# Patient Record
Sex: FEMALE | Race: WHITE
[De-identification: names, ages, dates, MRNs, and addresses within clinical notes are randomized per-mention and may not be internally consistent; named-entity substitution may affect disease eponyms.]

---

## 2022-10-06 ENCOUNTER — HOSPITAL ENCOUNTER (OUTPATIENT)
Dept: HOSPITAL 100 - PSN | Age: 67
Discharge: HOME | End: 2022-10-06
Payer: MEDICARE

## 2022-10-06 DIAGNOSIS — I48.0: Primary | ICD-10-CM

## 2022-10-06 PROCEDURE — 93225 XTRNL ECG REC<48 HRS REC: CPT

## 2022-10-06 PROCEDURE — 93226 XTRNL ECG REC<48 HR SCAN A/R: CPT

## 2022-10-24 ENCOUNTER — HOSPITAL ENCOUNTER (OUTPATIENT)
Dept: HOSPITAL 100 - CR | Age: 67
Discharge: HOME | End: 2022-10-24
Payer: MEDICARE

## 2022-10-24 DIAGNOSIS — I25.5: ICD-10-CM

## 2022-10-24 DIAGNOSIS — E78.5: ICD-10-CM

## 2022-10-24 DIAGNOSIS — Z95.5: Primary | ICD-10-CM

## 2022-10-24 DIAGNOSIS — I25.2: ICD-10-CM

## 2022-10-24 DIAGNOSIS — I46.9: ICD-10-CM

## 2022-10-24 DIAGNOSIS — I49.01: ICD-10-CM

## 2022-10-24 DIAGNOSIS — N18.9: ICD-10-CM

## 2022-10-24 DIAGNOSIS — I12.9: ICD-10-CM

## 2023-04-03 ENCOUNTER — HOSPITAL ENCOUNTER (EMERGENCY)
Age: 68
Discharge: HOME | End: 2023-04-03
Payer: MEDICARE

## 2023-04-03 VITALS
SYSTOLIC BLOOD PRESSURE: 139 MMHG | DIASTOLIC BLOOD PRESSURE: 84 MMHG | OXYGEN SATURATION: 98 % | RESPIRATION RATE: 15 BRPM | HEART RATE: 71 BPM

## 2023-04-03 VITALS — DIASTOLIC BLOOD PRESSURE: 64 MMHG | SYSTOLIC BLOOD PRESSURE: 112 MMHG

## 2023-04-03 VITALS
RESPIRATION RATE: 16 BRPM | OXYGEN SATURATION: 98 % | HEART RATE: 66 BPM | SYSTOLIC BLOOD PRESSURE: 93 MMHG | TEMPERATURE: 97.16 F | DIASTOLIC BLOOD PRESSURE: 59 MMHG

## 2023-04-03 VITALS — BODY MASS INDEX: 25.1 KG/M2

## 2023-04-03 DIAGNOSIS — I25.2: ICD-10-CM

## 2023-04-03 DIAGNOSIS — E78.5: ICD-10-CM

## 2023-04-03 DIAGNOSIS — E86.0: ICD-10-CM

## 2023-04-03 DIAGNOSIS — I48.0: ICD-10-CM

## 2023-04-03 DIAGNOSIS — I25.10: ICD-10-CM

## 2023-04-03 DIAGNOSIS — R19.7: Primary | ICD-10-CM

## 2023-04-03 DIAGNOSIS — I12.9: ICD-10-CM

## 2023-04-03 DIAGNOSIS — Z86.74: ICD-10-CM

## 2023-04-03 DIAGNOSIS — Z79.82: ICD-10-CM

## 2023-04-03 DIAGNOSIS — Z95.5: ICD-10-CM

## 2023-04-03 DIAGNOSIS — Z79.899: ICD-10-CM

## 2023-04-03 DIAGNOSIS — N18.9: ICD-10-CM

## 2023-04-03 DIAGNOSIS — N17.9: ICD-10-CM

## 2023-04-03 LAB
ALANINE AMINOTRANSFER ALT/SGPT: 15 U/L (ref 13–56)
ALBUMIN SERPL-MCNC: 3.1 G/DL (ref 3.2–5)
ALKALINE PHOSPHATASE: 70 U/L (ref 45–117)
ANION GAP: 6 (ref 5–15)
AST(SGOT): 14 U/L (ref 15–37)
BUN SERPL-MCNC: 30 MG/DL (ref 7–18)
BUN/CREAT RATIO: 16.3 RATIO (ref 10–20)
CALCIUM SERPL-MCNC: 9.5 MG/DL (ref 8.5–10.1)
CARBON DIOXIDE: 23 MMOL/L (ref 21–32)
CHLORIDE: 106 MMOL/L (ref 98–107)
DEPRECATED RDW RBC: 44.3 FL (ref 35.1–43.9)
DIFFERENTIAL INDICATED: (no result)
ERYTHROCYTE [DISTWIDTH] IN BLOOD: 13.4 % (ref 11.6–14.6)
EST GLOM FILT RATE - AFR AMER: 35 ML/MIN (ref 60–?)
ESTIMATED CREATININE CLEARANCE: 29.25 ML/MIN
GLOBULIN: 4.6 G/DL (ref 2.2–4.2)
GLUCOSE: 187 MG/DL (ref 74–106)
HCT VFR BLD AUTO: 34.9 % (ref 37–47)
HEMOGLOBIN: 11.5 G/DL (ref 12–15)
HGB BLD-MCNC: 11.5 G/DL (ref 12–15)
IMMATURE GRANULOCYTES COUNT: 0.03 X10^3/UL (ref 0–0)
MCV RBC: 89.5 FL (ref 81–99)
MEAN CORP HGB CONC: 33 G/DL (ref 32–36)
MEAN PLATELET VOL.: 9.9 FL (ref 6.2–12)
NRBC FLAGGED BY ANALYZER: 0 % (ref 0–5)
PLATELET # BLD: 176 K/MM3 (ref 150–450)
PLATELET COUNT: 176 K/MM3 (ref 150–450)
POSITIVE MORPHOLOGY: YES
POTASSIUM: 4.8 MMOL/L (ref 3.5–5.1)
RBC # BLD AUTO: 3.9 M/MM3 (ref 4.2–5.4)
RBC DISTRIBUTION WIDTH CV: 13.4 % (ref 11.6–14.6)
RBC DISTRIBUTION WIDTH SD: 44.3 FL (ref 35.1–43.9)
SCAN SMEAR PER REVIEW CRITERIA: (no result)
WBC # BLD AUTO: 6.8 K/MM3 (ref 4.4–11)
WHITE BLOOD COUNT: 6.8 K/MM3 (ref 4.4–11)

## 2023-04-03 PROCEDURE — 85025 COMPLETE CBC W/AUTO DIFF WBC: CPT

## 2023-04-03 PROCEDURE — 87493 C DIFF AMPLIFIED PROBE: CPT

## 2023-04-03 PROCEDURE — 83630 LACTOFERRIN FECAL (QUAL): CPT

## 2023-04-03 PROCEDURE — 87209 SMEAR COMPLEX STAIN: CPT

## 2023-04-03 PROCEDURE — 99283 EMERGENCY DEPT VISIT LOW MDM: CPT

## 2023-04-03 PROCEDURE — 87177 OVA AND PARASITES SMEARS: CPT

## 2023-04-03 PROCEDURE — 96360 HYDRATION IV INFUSION INIT: CPT

## 2023-04-03 PROCEDURE — A4216 STERILE WATER/SALINE, 10 ML: HCPCS

## 2023-04-03 PROCEDURE — 87506 IADNA-DNA/RNA PROBE TQ 6-11: CPT

## 2023-04-03 PROCEDURE — 96361 HYDRATE IV INFUSION ADD-ON: CPT

## 2023-04-03 PROCEDURE — 80053 COMPREHEN METABOLIC PANEL: CPT

## 2023-05-20 LAB
ANION GAP IN SER/PLAS: 14 MMOL/L (ref 10–20)
CALCIUM (MG/DL) IN SER/PLAS: 10 MG/DL (ref 8.6–10.6)
CARBON DIOXIDE, TOTAL (MMOL/L) IN SER/PLAS: 26 MMOL/L (ref 21–32)
CHLORIDE (MMOL/L) IN SER/PLAS: 101 MMOL/L (ref 98–107)
CREATININE (MG/DL) IN SER/PLAS: 1.32 MG/DL (ref 0.5–1.05)
GFR FEMALE: 44 ML/MIN/1.73M2
GLUCOSE (MG/DL) IN SER/PLAS: 170 MG/DL (ref 74–99)
POTASSIUM (MMOL/L) IN SER/PLAS: 4.7 MMOL/L (ref 3.5–5.3)
SODIUM (MMOL/L) IN SER/PLAS: 136 MMOL/L (ref 136–145)
UREA NITROGEN (MG/DL) IN SER/PLAS: 29 MG/DL (ref 6–23)

## 2023-07-12 DIAGNOSIS — E11.9 CONTROLLED TYPE 2 DIABETES MELLITUS WITHOUT COMPLICATION, WITHOUT LONG-TERM CURRENT USE OF INSULIN (MULTI): Primary | ICD-10-CM

## 2023-07-12 DIAGNOSIS — I10 ESSENTIAL (PRIMARY) HYPERTENSION: ICD-10-CM

## 2023-07-12 RX ORDER — AMLODIPINE BESYLATE 2.5 MG/1
TABLET ORAL
Qty: 90 TABLET | Refills: 3 | OUTPATIENT
Start: 2023-07-12

## 2023-07-12 RX ORDER — METFORMIN HYDROCHLORIDE 500 MG/1
500 TABLET ORAL EVERY 12 HOURS
Qty: 180 TABLET | Refills: 0 | Status: SHIPPED | OUTPATIENT
Start: 2023-07-12 | End: 2023-07-31 | Stop reason: SDUPTHER

## 2023-07-12 RX ORDER — METFORMIN HYDROCHLORIDE 500 MG/1
1 TABLET ORAL EVERY 12 HOURS
COMMUNITY
Start: 2022-10-20 | End: 2023-07-12 | Stop reason: SDUPTHER

## 2023-07-30 PROBLEM — R93.89 ABNORMAL CHEST CT: Status: RESOLVED | Noted: 2023-07-30 | Resolved: 2023-07-30

## 2023-07-30 PROBLEM — R93.89 ABNORMAL CHEST CT: Status: ACTIVE | Noted: 2023-07-30

## 2023-07-30 PROBLEM — E55.9 VITAMIN D DEFICIENCY: Status: ACTIVE | Noted: 2023-07-30

## 2023-07-30 PROBLEM — I10 BENIGN ESSENTIAL HYPERTENSION: Status: ACTIVE | Noted: 2023-07-30

## 2023-07-30 PROBLEM — N18.30 CKD STAGE 3 SECONDARY TO DIABETES (MULTI): Status: ACTIVE | Noted: 2023-07-30

## 2023-07-30 PROBLEM — R80.9 MICROALBUMINURIA: Status: ACTIVE | Noted: 2023-07-30

## 2023-07-30 PROBLEM — I25.2 HISTORY OF ST ELEVATION MYOCARDIAL INFARCTION (STEMI): Status: ACTIVE | Noted: 2023-07-30

## 2023-07-30 PROBLEM — I25.10 CAD (CORONARY ARTERY DISEASE): Status: ACTIVE | Noted: 2023-07-30

## 2023-07-30 PROBLEM — J18.9 PNEUMONIA: Status: ACTIVE | Noted: 2023-07-30

## 2023-07-30 PROBLEM — E78.5 HYPERLIPIDEMIA: Status: ACTIVE | Noted: 2023-07-30

## 2023-07-30 PROBLEM — E11.22 CKD STAGE 3 SECONDARY TO DIABETES (MULTI): Status: ACTIVE | Noted: 2023-07-30

## 2023-07-30 PROBLEM — E11.9 DIABETES MELLITUS, TYPE 2 (MULTI): Status: ACTIVE | Noted: 2023-07-30

## 2023-07-30 PROBLEM — I25.5 ISCHEMIC CARDIOMYOPATHY: Status: ACTIVE | Noted: 2023-07-30

## 2023-07-30 PROBLEM — R76.8 POSITIVE ANA (ANTINUCLEAR ANTIBODY): Status: ACTIVE | Noted: 2023-07-30

## 2023-07-30 PROBLEM — R79.89 ABNORMAL TSH: Status: ACTIVE | Noted: 2023-07-30

## 2023-07-30 PROBLEM — J18.9 PNEUMONIA: Status: RESOLVED | Noted: 2023-07-30 | Resolved: 2023-07-30

## 2023-07-30 PROBLEM — D64.9 ANEMIA: Status: ACTIVE | Noted: 2023-07-30

## 2023-07-30 PROBLEM — I48.0 PAROXYSMAL ATRIAL FIBRILLATION (MULTI): Status: ACTIVE | Noted: 2023-07-30

## 2023-07-30 RX ORDER — SITAGLIPTIN 50 MG/1
1 TABLET, FILM COATED ORAL DAILY
COMMUNITY
Start: 2022-10-20 | End: 2023-07-31 | Stop reason: SDUPTHER

## 2023-07-30 RX ORDER — CARVEDILOL 25 MG/1
1 TABLET ORAL 2 TIMES DAILY
COMMUNITY
Start: 2022-10-13 | End: 2023-07-31 | Stop reason: SDUPTHER

## 2023-07-30 RX ORDER — TICAGRELOR 90 MG/1
1 TABLET ORAL 2 TIMES DAILY
COMMUNITY
Start: 2022-03-09 | End: 2023-08-02 | Stop reason: SINTOL

## 2023-07-30 RX ORDER — IBUPROFEN 200 MG
CAPSULE ORAL
COMMUNITY
Start: 2020-11-25

## 2023-07-30 RX ORDER — LISINOPRIL 10 MG/1
1 TABLET ORAL DAILY
COMMUNITY
Start: 2020-03-10 | End: 2023-07-31 | Stop reason: SDUPTHER

## 2023-07-30 RX ORDER — ATORVASTATIN CALCIUM 80 MG/1
1 TABLET, FILM COATED ORAL DAILY
COMMUNITY
Start: 2020-11-19 | End: 2023-07-31 | Stop reason: SDUPTHER

## 2023-07-30 RX ORDER — VIT C/E/ZN/COPPR/LUTEIN/ZEAXAN 250MG-90MG
1 CAPSULE ORAL DAILY
COMMUNITY
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
## 2023-07-31 ENCOUNTER — LAB (OUTPATIENT)
Dept: LAB | Facility: LAB | Age: 68
End: 2023-07-31
Payer: MEDICARE

## 2023-07-31 ENCOUNTER — OFFICE VISIT (OUTPATIENT)
Dept: PRIMARY CARE | Facility: CLINIC | Age: 68
End: 2023-07-31
Payer: MEDICARE

## 2023-07-31 VITALS
RESPIRATION RATE: 16 BRPM | BODY MASS INDEX: 25.93 KG/M2 | OXYGEN SATURATION: 97 % | TEMPERATURE: 97.7 F | DIASTOLIC BLOOD PRESSURE: 69 MMHG | WEIGHT: 171.1 LBS | HEIGHT: 68 IN | SYSTOLIC BLOOD PRESSURE: 102 MMHG | HEART RATE: 103 BPM

## 2023-07-31 DIAGNOSIS — Z00.00 ROUTINE GENERAL MEDICAL EXAMINATION AT HEALTH CARE FACILITY: Primary | ICD-10-CM

## 2023-07-31 DIAGNOSIS — I25.5 ISCHEMIC CARDIOMYOPATHY: ICD-10-CM

## 2023-07-31 DIAGNOSIS — D64.9 ANEMIA, UNSPECIFIED TYPE: ICD-10-CM

## 2023-07-31 DIAGNOSIS — R06.09 DYSPNEA ON EXERTION: ICD-10-CM

## 2023-07-31 DIAGNOSIS — I25.2 HISTORY OF ST ELEVATION MYOCARDIAL INFARCTION (STEMI): ICD-10-CM

## 2023-07-31 DIAGNOSIS — I25.10 CORONARY ARTERY DISEASE INVOLVING NATIVE CORONARY ARTERY OF NATIVE HEART WITHOUT ANGINA PECTORIS: ICD-10-CM

## 2023-07-31 DIAGNOSIS — K52.9 CHRONIC DIARRHEA: ICD-10-CM

## 2023-07-31 DIAGNOSIS — E11.22 CKD STAGE 3 SECONDARY TO DIABETES (MULTI): ICD-10-CM

## 2023-07-31 DIAGNOSIS — I48.91 ATRIAL FIBRILLATION WITH RVR (MULTI): ICD-10-CM

## 2023-07-31 DIAGNOSIS — E78.5 HYPERLIPIDEMIA, UNSPECIFIED HYPERLIPIDEMIA TYPE: ICD-10-CM

## 2023-07-31 DIAGNOSIS — I48.0 PAROXYSMAL ATRIAL FIBRILLATION (MULTI): ICD-10-CM

## 2023-07-31 DIAGNOSIS — R79.89 ABNORMAL TSH: ICD-10-CM

## 2023-07-31 DIAGNOSIS — E11.9 TYPE 2 DIABETES MELLITUS WITHOUT COMPLICATION, WITHOUT LONG-TERM CURRENT USE OF INSULIN (MULTI): ICD-10-CM

## 2023-07-31 DIAGNOSIS — E55.9 VITAMIN D DEFICIENCY: ICD-10-CM

## 2023-07-31 DIAGNOSIS — Z12.31 SCREENING MAMMOGRAM, ENCOUNTER FOR: ICD-10-CM

## 2023-07-31 DIAGNOSIS — N18.30 CKD STAGE 3 SECONDARY TO DIABETES (MULTI): ICD-10-CM

## 2023-07-31 DIAGNOSIS — E11.9 CONTROLLED TYPE 2 DIABETES MELLITUS WITHOUT COMPLICATION, WITHOUT LONG-TERM CURRENT USE OF INSULIN (MULTI): ICD-10-CM

## 2023-07-31 DIAGNOSIS — R80.9 MICROALBUMINURIA: ICD-10-CM

## 2023-07-31 DIAGNOSIS — I10 BENIGN ESSENTIAL HYPERTENSION: ICD-10-CM

## 2023-07-31 DIAGNOSIS — Z00.00 MEDICARE ANNUAL WELLNESS VISIT, SUBSEQUENT: ICD-10-CM

## 2023-07-31 LAB — POC HEMOGLOBIN A1C: 8.5 % (ref 4.2–6.5)

## 2023-07-31 PROCEDURE — 82746 ASSAY OF FOLIC ACID SERUM: CPT

## 2023-07-31 PROCEDURE — 4010F ACE/ARB THERAPY RXD/TAKEN: CPT | Performed by: FAMILY MEDICINE

## 2023-07-31 PROCEDURE — 85025 COMPLETE CBC W/AUTO DIFF WBC: CPT

## 2023-07-31 PROCEDURE — 82570 ASSAY OF URINE CREATININE: CPT

## 2023-07-31 PROCEDURE — 3078F DIAST BP <80 MM HG: CPT | Performed by: FAMILY MEDICINE

## 2023-07-31 PROCEDURE — 82043 UR ALBUMIN QUANTITATIVE: CPT

## 2023-07-31 PROCEDURE — 36415 COLL VENOUS BLD VENIPUNCTURE: CPT

## 2023-07-31 PROCEDURE — 83036 HEMOGLOBIN GLYCOSYLATED A1C: CPT | Performed by: FAMILY MEDICINE

## 2023-07-31 PROCEDURE — 99215 OFFICE O/P EST HI 40 MIN: CPT | Performed by: FAMILY MEDICINE

## 2023-07-31 PROCEDURE — 3066F NEPHROPATHY DOC TX: CPT | Performed by: FAMILY MEDICINE

## 2023-07-31 PROCEDURE — 1036F TOBACCO NON-USER: CPT | Performed by: FAMILY MEDICINE

## 2023-07-31 PROCEDURE — 1160F RVW MEDS BY RX/DR IN RCRD: CPT | Performed by: FAMILY MEDICINE

## 2023-07-31 PROCEDURE — 93000 ELECTROCARDIOGRAM COMPLETE: CPT | Performed by: FAMILY MEDICINE

## 2023-07-31 PROCEDURE — 82728 ASSAY OF FERRITIN: CPT

## 2023-07-31 PROCEDURE — 3074F SYST BP LT 130 MM HG: CPT | Performed by: FAMILY MEDICINE

## 2023-07-31 PROCEDURE — 1170F FXNL STATUS ASSESSED: CPT | Performed by: FAMILY MEDICINE

## 2023-07-31 PROCEDURE — 83550 IRON BINDING TEST: CPT

## 2023-07-31 PROCEDURE — 80053 COMPREHEN METABOLIC PANEL: CPT

## 2023-07-31 PROCEDURE — 1159F MED LIST DOCD IN RCRD: CPT | Performed by: FAMILY MEDICINE

## 2023-07-31 PROCEDURE — 83540 ASSAY OF IRON: CPT

## 2023-07-31 PROCEDURE — 82306 VITAMIN D 25 HYDROXY: CPT

## 2023-07-31 PROCEDURE — G0439 PPPS, SUBSEQ VISIT: HCPCS | Performed by: FAMILY MEDICINE

## 2023-07-31 PROCEDURE — 82607 VITAMIN B-12: CPT

## 2023-07-31 RX ORDER — FERROUS SULFATE 325(65) MG
TABLET ORAL
COMMUNITY
Start: 2022-08-12

## 2023-07-31 RX ORDER — ATORVASTATIN CALCIUM 80 MG/1
80 TABLET, FILM COATED ORAL DAILY
Qty: 90 TABLET | Refills: 3 | Status: SHIPPED | OUTPATIENT
Start: 2023-07-31 | End: 2024-07-30

## 2023-07-31 RX ORDER — LISINOPRIL 10 MG/1
10 TABLET ORAL DAILY
Qty: 90 TABLET | Refills: 3 | Status: SHIPPED | OUTPATIENT
Start: 2023-07-31 | End: 2023-08-04 | Stop reason: ALTCHOICE

## 2023-07-31 RX ORDER — CARVEDILOL 25 MG/1
25 TABLET ORAL 2 TIMES DAILY
Qty: 180 TABLET | Refills: 3 | Status: SHIPPED | OUTPATIENT
Start: 2023-07-31 | End: 2024-07-30

## 2023-07-31 RX ORDER — METFORMIN HYDROCHLORIDE 500 MG/1
500 TABLET ORAL EVERY 12 HOURS
Qty: 180 TABLET | Refills: 3 | Status: SHIPPED | OUTPATIENT
Start: 2023-07-31 | End: 2024-02-22 | Stop reason: SDUPTHER

## 2023-07-31 ASSESSMENT — ENCOUNTER SYMPTOMS
HEADACHES: 0
PALPITATIONS: 0
ABDOMINAL PAIN: 0
NAUSEA: 0
DIZZINESS: 0
CHILLS: 0
NUMBNESS: 0
MUSCULOSKELETAL NEGATIVE: 1
SORE THROAT: 0
FEVER: 0
SHORTNESS OF BREATH: 1
DIARRHEA: 0
CONSTIPATION: 0
WEAKNESS: 0
FATIGUE: 1
VOMITING: 0
PSYCHIATRIC NEGATIVE: 1
COUGH: 0

## 2023-07-31 ASSESSMENT — LIFESTYLE VARIABLES
HOW OFTEN DO YOU HAVE A DRINK CONTAINING ALCOHOL: NEVER
AUDIT-C TOTAL SCORE: 0
HOW MANY STANDARD DRINKS CONTAINING ALCOHOL DO YOU HAVE ON A TYPICAL DAY: PATIENT DOES NOT DRINK
HAVE YOU OR SOMEONE ELSE BEEN INJURED AS A RESULT OF YOUR DRINKING: NO
HOW OFTEN DURING THE LAST YEAR HAVE YOU BEEN UNABLE TO REMEMBER WHAT HAPPENED THE NIGHT BEFORE BECAUSE YOU HAD BEEN DRINKING: NEVER
HOW OFTEN DURING THE LAST YEAR HAVE YOU HAD A FEELING OF GUILT OR REMORSE AFTER DRINKING: NEVER
HOW OFTEN DURING THE LAST YEAR HAVE YOU NEEDED AN ALCOHOLIC DRINK FIRST THING IN THE MORNING TO GET YOURSELF GOING AFTER A NIGHT OF HEAVY DRINKING: NEVER
HOW OFTEN DURING THE LAST YEAR HAVE YOU FAILED TO DO WHAT WAS NORMALLY EXPECTED FROM YOU BECAUSE OF DRINKING: NEVER
SKIP TO QUESTIONS 9-10: 1
AUDIT TOTAL SCORE: 0
HOW OFTEN DURING THE LAST YEAR HAVE YOU FOUND THAT YOU WERE NOT ABLE TO STOP DRINKING ONCE YOU HAD STARTED: NEVER
HAS A RELATIVE, FRIEND, DOCTOR, OR ANOTHER HEALTH PROFESSIONAL EXPRESSED CONCERN ABOUT YOUR DRINKING OR SUGGESTED YOU CUT DOWN: NO
HOW OFTEN DO YOU HAVE SIX OR MORE DRINKS ON ONE OCCASION: NEVER

## 2023-07-31 ASSESSMENT — ACTIVITIES OF DAILY LIVING (ADL)
MANAGING_FINANCES: INDEPENDENT
DRESSING: INDEPENDENT
BATHING: INDEPENDENT
TAKING_MEDICATION: INDEPENDENT
GROCERY_SHOPPING: INDEPENDENT
DOING_HOUSEWORK: INDEPENDENT

## 2023-07-31 ASSESSMENT — PATIENT HEALTH QUESTIONNAIRE - PHQ9
2. FEELING DOWN, DEPRESSED OR HOPELESS: NOT AT ALL
1. LITTLE INTEREST OR PLEASURE IN DOING THINGS: NOT AT ALL
SUM OF ALL RESPONSES TO PHQ9 QUESTIONS 1 AND 2: 0

## 2023-07-31 NOTE — PATIENT INSTRUCTIONS
A1c 8.5 today  Add on Jardiance 10 mg daily   Discuss with endocrinologist next month     Labs ordered    EKG today shows you are in atrial fibrillation with rapid ventricular rate   She is already on carvedilol with BP on low end of normal  I called her cardiologist's office and they will see her tomorrow (they will call her with appointment time)   Dyspnea may also be due to Brilinta    Discussed stroke risk and will defer anticoagulation to cardiology     Chest xray ordered    Schedule with GI for chronic diarrhea     Schedule mammogram

## 2023-07-31 NOTE — ASSESSMENT & PLAN NOTE
I called Dr. Felder's office and they scheduled her to be seen tomorrow.    Discussed stroke risk and will defer anticoagulation to cardiology. [id number]-OMBx score 6- high risk   She is on carvedilol currently.

## 2023-07-31 NOTE — ASSESSMENT & PLAN NOTE
Hx VF cardiac arrest/ STEMI in Feb 2022- has 6 stents- follows with Dr. Fadi Felder- on aspirin, brilinta, lipitor, carvedilol, lisinopril

## 2023-08-01 ENCOUNTER — TELEPHONE (OUTPATIENT)
Dept: PRIMARY CARE | Facility: CLINIC | Age: 68
End: 2023-08-01
Payer: MEDICARE

## 2023-08-01 PROBLEM — J90 PLEURAL EFFUSION: Status: ACTIVE | Noted: 2023-08-01

## 2023-08-01 LAB
ALANINE AMINOTRANSFERASE (SGPT) (U/L) IN SER/PLAS: 13 U/L (ref 7–45)
ALBUMIN (G/DL) IN SER/PLAS: 4.3 G/DL (ref 3.4–5)
ALBUMIN (MG/L) IN URINE: 233.1 MG/L
ALBUMIN/CREATININE (UG/MG) IN URINE: 155.4 UG/MG CRT (ref 0–30)
ALKALINE PHOSPHATASE (U/L) IN SER/PLAS: 56 U/L (ref 33–136)
ANION GAP IN SER/PLAS: 17 MMOL/L (ref 10–20)
ASPARTATE AMINOTRANSFERASE (SGOT) (U/L) IN SER/PLAS: 12 U/L (ref 9–39)
BASOPHILS (10*3/UL) IN BLOOD BY AUTOMATED COUNT: 0.03 X10E9/L (ref 0–0.1)
BASOPHILS/100 LEUKOCYTES IN BLOOD BY AUTOMATED COUNT: 0.4 % (ref 0–2)
BILIRUBIN TOTAL (MG/DL) IN SER/PLAS: 1.1 MG/DL (ref 0–1.2)
CALCIDIOL (25 OH VITAMIN D3) (NG/ML) IN SER/PLAS: 44 NG/ML
CALCIUM (MG/DL) IN SER/PLAS: 9.8 MG/DL (ref 8.6–10.6)
CARBON DIOXIDE, TOTAL (MMOL/L) IN SER/PLAS: 23 MMOL/L (ref 21–32)
CHLORIDE (MMOL/L) IN SER/PLAS: 103 MMOL/L (ref 98–107)
COBALAMIN (VITAMIN B12) (PG/ML) IN SER/PLAS: 327 PG/ML (ref 211–911)
CREATININE (MG/DL) IN SER/PLAS: 1.38 MG/DL (ref 0.5–1.05)
CREATININE (MG/DL) IN URINE: 150 MG/DL (ref 20–320)
EOSINOPHILS (10*3/UL) IN BLOOD BY AUTOMATED COUNT: 0.1 X10E9/L (ref 0–0.7)
EOSINOPHILS/100 LEUKOCYTES IN BLOOD BY AUTOMATED COUNT: 1.4 % (ref 0–6)
ERYTHROCYTE DISTRIBUTION WIDTH (RATIO) BY AUTOMATED COUNT: 14.3 % (ref 11.5–14.5)
ERYTHROCYTE MEAN CORPUSCULAR HEMOGLOBIN CONCENTRATION (G/DL) BY AUTOMATED: 30.9 G/DL (ref 32–36)
ERYTHROCYTE MEAN CORPUSCULAR VOLUME (FL) BY AUTOMATED COUNT: 92 FL (ref 80–100)
ERYTHROCYTES (10*6/UL) IN BLOOD BY AUTOMATED COUNT: 3.89 X10E12/L (ref 4–5.2)
FERRITIN (UG/LL) IN SER/PLAS: 51 UG/L (ref 8–150)
FOLATE (NG/ML) IN SER/PLAS: 10.1 NG/ML
GFR FEMALE: 42 ML/MIN/1.73M2
GLUCOSE (MG/DL) IN SER/PLAS: 236 MG/DL (ref 74–99)
HEMATOCRIT (%) IN BLOOD BY AUTOMATED COUNT: 35.9 % (ref 36–46)
HEMOGLOBIN (G/DL) IN BLOOD: 11.1 G/DL (ref 12–16)
IMMATURE GRANULOCYTES/100 LEUKOCYTES IN BLOOD BY AUTOMATED COUNT: 0.7 % (ref 0–0.9)
IRON (UG/DL) IN SER/PLAS: 65 UG/DL (ref 35–150)
IRON BINDING CAPACITY (UG/DL) IN SER/PLAS: 435 UG/DL (ref 240–445)
IRON SATURATION (%) IN SER/PLAS: 15 % (ref 25–45)
LEUKOCYTES (10*3/UL) IN BLOOD BY AUTOMATED COUNT: 7 X10E9/L (ref 4.4–11.3)
LYMPHOCYTES (10*3/UL) IN BLOOD BY AUTOMATED COUNT: 1.1 X10E9/L (ref 1.2–4.8)
LYMPHOCYTES/100 LEUKOCYTES IN BLOOD BY AUTOMATED COUNT: 15.8 % (ref 13–44)
MONOCYTES (10*3/UL) IN BLOOD BY AUTOMATED COUNT: 0.57 X10E9/L (ref 0.1–1)
MONOCYTES/100 LEUKOCYTES IN BLOOD BY AUTOMATED COUNT: 8.2 % (ref 2–10)
NEUTROPHILS (10*3/UL) IN BLOOD BY AUTOMATED COUNT: 5.11 X10E9/L (ref 1.2–7.7)
NEUTROPHILS/100 LEUKOCYTES IN BLOOD BY AUTOMATED COUNT: 73.5 % (ref 40–80)
NRBC (PER 100 WBCS) BY AUTOMATED COUNT: 0 /100 WBC (ref 0–0)
PLATELETS (10*3/UL) IN BLOOD AUTOMATED COUNT: 226 X10E9/L (ref 150–450)
POTASSIUM (MMOL/L) IN SER/PLAS: 4.8 MMOL/L (ref 3.5–5.3)
PROTEIN TOTAL: 7.6 G/DL (ref 6.4–8.2)
SODIUM (MMOL/L) IN SER/PLAS: 138 MMOL/L (ref 136–145)
UREA NITROGEN (MG/DL) IN SER/PLAS: 20 MG/DL (ref 6–23)

## 2023-08-02 ENCOUNTER — HOSPITAL ENCOUNTER (INPATIENT)
Dept: HOSPITAL 100 - ED | Age: 68
LOS: 2 days | Discharge: HOME | DRG: 291 | End: 2023-08-04
Payer: MEDICARE

## 2023-08-02 VITALS
DIASTOLIC BLOOD PRESSURE: 80 MMHG | RESPIRATION RATE: 18 BRPM | TEMPERATURE: 97.88 F | OXYGEN SATURATION: 94 % | HEART RATE: 110 BPM | SYSTOLIC BLOOD PRESSURE: 118 MMHG

## 2023-08-02 VITALS
RESPIRATION RATE: 18 BRPM | SYSTOLIC BLOOD PRESSURE: 88 MMHG | DIASTOLIC BLOOD PRESSURE: 56 MMHG | OXYGEN SATURATION: 97 % | HEART RATE: 95 BPM

## 2023-08-02 VITALS
DIASTOLIC BLOOD PRESSURE: 58 MMHG | HEART RATE: 78 BPM | TEMPERATURE: 96.98 F | SYSTOLIC BLOOD PRESSURE: 85 MMHG | RESPIRATION RATE: 18 BRPM | OXYGEN SATURATION: 96 %

## 2023-08-02 VITALS
RESPIRATION RATE: 18 BRPM | HEART RATE: 81 BPM | DIASTOLIC BLOOD PRESSURE: 74 MMHG | OXYGEN SATURATION: 96 % | SYSTOLIC BLOOD PRESSURE: 105 MMHG

## 2023-08-02 VITALS
SYSTOLIC BLOOD PRESSURE: 118 MMHG | RESPIRATION RATE: 21 BRPM | OXYGEN SATURATION: 96 % | HEART RATE: 114 BPM | DIASTOLIC BLOOD PRESSURE: 80 MMHG

## 2023-08-02 VITALS
DIASTOLIC BLOOD PRESSURE: 54 MMHG | RESPIRATION RATE: 18 BRPM | SYSTOLIC BLOOD PRESSURE: 87 MMHG | OXYGEN SATURATION: 100 % | HEART RATE: 92 BPM

## 2023-08-02 VITALS
SYSTOLIC BLOOD PRESSURE: 105 MMHG | DIASTOLIC BLOOD PRESSURE: 71 MMHG | OXYGEN SATURATION: 97 % | HEART RATE: 116 BPM | RESPIRATION RATE: 20 BRPM

## 2023-08-02 VITALS
HEART RATE: 97 BPM | DIASTOLIC BLOOD PRESSURE: 65 MMHG | SYSTOLIC BLOOD PRESSURE: 100 MMHG | RESPIRATION RATE: 16 BRPM | OXYGEN SATURATION: 98 %

## 2023-08-02 VITALS — SYSTOLIC BLOOD PRESSURE: 86 MMHG | HEART RATE: 78 BPM | OXYGEN SATURATION: 98 % | DIASTOLIC BLOOD PRESSURE: 48 MMHG

## 2023-08-02 VITALS
SYSTOLIC BLOOD PRESSURE: 131 MMHG | DIASTOLIC BLOOD PRESSURE: 86 MMHG | OXYGEN SATURATION: 97 % | HEART RATE: 128 BPM | RESPIRATION RATE: 26 BRPM

## 2023-08-02 VITALS
DIASTOLIC BLOOD PRESSURE: 66 MMHG | TEMPERATURE: 98.8 F | SYSTOLIC BLOOD PRESSURE: 110 MMHG | HEART RATE: 114 BPM | RESPIRATION RATE: 22 BRPM | OXYGEN SATURATION: 90 %

## 2023-08-02 VITALS
TEMPERATURE: 97 F | HEART RATE: 97 BPM | OXYGEN SATURATION: 98 % | SYSTOLIC BLOOD PRESSURE: 105 MMHG | RESPIRATION RATE: 18 BRPM | DIASTOLIC BLOOD PRESSURE: 74 MMHG

## 2023-08-02 VITALS
OXYGEN SATURATION: 98 % | HEART RATE: 94 BPM | SYSTOLIC BLOOD PRESSURE: 105 MMHG | RESPIRATION RATE: 19 BRPM | DIASTOLIC BLOOD PRESSURE: 71 MMHG

## 2023-08-02 VITALS — BODY MASS INDEX: 26.1 KG/M2 | BODY MASS INDEX: 26.2 KG/M2 | BODY MASS INDEX: 26.8 KG/M2 | BODY MASS INDEX: 26.6 KG/M2

## 2023-08-02 VITALS
DIASTOLIC BLOOD PRESSURE: 80 MMHG | TEMPERATURE: 97.8 F | OXYGEN SATURATION: 97 % | RESPIRATION RATE: 16 BRPM | HEART RATE: 125 BPM | SYSTOLIC BLOOD PRESSURE: 119 MMHG

## 2023-08-02 VITALS — HEART RATE: 80 BPM | DIASTOLIC BLOOD PRESSURE: 58 MMHG | SYSTOLIC BLOOD PRESSURE: 90 MMHG

## 2023-08-02 VITALS — OXYGEN SATURATION: 95 %

## 2023-08-02 VITALS
OXYGEN SATURATION: 99 % | DIASTOLIC BLOOD PRESSURE: 76 MMHG | RESPIRATION RATE: 16 BRPM | SYSTOLIC BLOOD PRESSURE: 121 MMHG | HEART RATE: 125 BPM

## 2023-08-02 VITALS — SYSTOLIC BLOOD PRESSURE: 86 MMHG | HEART RATE: 87 BPM | DIASTOLIC BLOOD PRESSURE: 49 MMHG

## 2023-08-02 VITALS
OXYGEN SATURATION: 97 % | TEMPERATURE: 97 F | RESPIRATION RATE: 18 BRPM | SYSTOLIC BLOOD PRESSURE: 91 MMHG | HEART RATE: 90 BPM | DIASTOLIC BLOOD PRESSURE: 63 MMHG

## 2023-08-02 VITALS — HEART RATE: 78 BPM

## 2023-08-02 VITALS — OXYGEN SATURATION: 98 %

## 2023-08-02 VITALS
HEART RATE: 107 BPM | OXYGEN SATURATION: 97 % | DIASTOLIC BLOOD PRESSURE: 74 MMHG | SYSTOLIC BLOOD PRESSURE: 125 MMHG | RESPIRATION RATE: 18 BRPM

## 2023-08-02 VITALS — HEART RATE: 90 BPM | OXYGEN SATURATION: 98 %

## 2023-08-02 VITALS
RESPIRATION RATE: 20 BRPM | SYSTOLIC BLOOD PRESSURE: 83 MMHG | DIASTOLIC BLOOD PRESSURE: 62 MMHG | OXYGEN SATURATION: 97 % | HEART RATE: 123 BPM

## 2023-08-02 DIAGNOSIS — D63.1: ICD-10-CM

## 2023-08-02 DIAGNOSIS — I48.0: ICD-10-CM

## 2023-08-02 DIAGNOSIS — N18.32: ICD-10-CM

## 2023-08-02 DIAGNOSIS — Z79.01: ICD-10-CM

## 2023-08-02 DIAGNOSIS — I25.10: ICD-10-CM

## 2023-08-02 DIAGNOSIS — I25.2: ICD-10-CM

## 2023-08-02 DIAGNOSIS — I13.0: Primary | ICD-10-CM

## 2023-08-02 DIAGNOSIS — K52.9: ICD-10-CM

## 2023-08-02 DIAGNOSIS — Z86.74: ICD-10-CM

## 2023-08-02 DIAGNOSIS — M94.0: ICD-10-CM

## 2023-08-02 DIAGNOSIS — I50.33: ICD-10-CM

## 2023-08-02 DIAGNOSIS — Z79.899: ICD-10-CM

## 2023-08-02 DIAGNOSIS — E78.5: ICD-10-CM

## 2023-08-02 DIAGNOSIS — N17.9: ICD-10-CM

## 2023-08-02 DIAGNOSIS — E11.22: ICD-10-CM

## 2023-08-02 DIAGNOSIS — Z79.82: ICD-10-CM

## 2023-08-02 DIAGNOSIS — Z95.5: ICD-10-CM

## 2023-08-02 DIAGNOSIS — I25.5: ICD-10-CM

## 2023-08-02 DIAGNOSIS — Z79.84: ICD-10-CM

## 2023-08-02 LAB
ANION GAP: 6 (ref 5–15)
BNP,B-TYPE NATRIURETIC PEPTIDE: 472.4 PG/ML (ref 0–100)
BUN SERPL-MCNC: 21 MG/DL (ref 7–18)
BUN/CREAT RATIO: 13.5 RATIO (ref 10–20)
CALCIUM SERPL-MCNC: 9.5 MG/DL (ref 8.5–10.1)
CARBON DIOXIDE: 22 MMOL/L (ref 21–32)
CARDIAC TROPONIN I PNL SERPL HS: 28 PG/ML (ref 3–54)
CHLORIDE: 107 MMOL/L (ref 98–107)
D-DIMER QUANTITATIVE (DVT/PE): 3.51 FEU/UG/M (ref 0.27–0.49)
DEPRECATED RDW RBC: 47.9 FL (ref 35.1–43.9)
ERYTHROCYTE [DISTWIDTH] IN BLOOD: 14.1 % (ref 11.6–14.6)
EST GLOM FILT RATE - AFR AMER: 43 ML/MIN (ref 60–?)
ESTIMATED CREATININE CLEARANCE: 34.03 ML/MIN
GLUCOSE: 207 MG/DL (ref 74–106)
HCT VFR BLD AUTO: 36.3 % (ref 37–47)
HEMOGLOBIN: 11.1 G/DL (ref 12–15)
HGB BLD-MCNC: 11.1 G/DL (ref 12–15)
IMMATURE GRANULOCYTES COUNT: 0.07 X10^3/UL (ref 0–0)
MAGNESIUM: 1.6 MG/DL (ref 1.6–2.6)
MCV RBC: 91.7 FL (ref 81–99)
MEAN CORP HGB CONC: 30.6 G/DL (ref 32–36)
MEAN PLATELET VOL.: 10.3 FL (ref 6.2–12)
NRBC FLAGGED BY ANALYZER: 0 % (ref 0–5)
PLATELET # BLD: 190 K/MM3 (ref 150–450)
PLATELET COUNT: 190 K/MM3 (ref 150–450)
POTASSIUM: 4.6 MMOL/L (ref 3.5–5.1)
RBC # BLD AUTO: 3.96 M/MM3 (ref 4.2–5.4)
RBC DISTRIBUTION WIDTH CV: 14.1 % (ref 11.6–14.6)
RBC DISTRIBUTION WIDTH SD: 47.9 FL (ref 35.1–43.9)
TROPONIN-I HS: 27 PG/ML (ref 3–54)
TROPONIN-I HS: 31 PG/ML (ref 3–54)
WBC # BLD AUTO: 8.8 K/MM3 (ref 4.4–11)
WHITE BLOOD COUNT: 8.8 K/MM3 (ref 4.4–11)

## 2023-08-02 PROCEDURE — 93306 TTE W/DOPPLER COMPLETE: CPT

## 2023-08-02 PROCEDURE — 93005 ELECTROCARDIOGRAM TRACING: CPT

## 2023-08-02 PROCEDURE — 80048 BASIC METABOLIC PNL TOTAL CA: CPT

## 2023-08-02 PROCEDURE — 84443 ASSAY THYROID STIM HORMONE: CPT

## 2023-08-02 PROCEDURE — 94762 N-INVAS EAR/PLS OXIMTRY CONT: CPT

## 2023-08-02 PROCEDURE — 83735 ASSAY OF MAGNESIUM: CPT

## 2023-08-02 PROCEDURE — 71275 CT ANGIOGRAPHY CHEST: CPT

## 2023-08-02 PROCEDURE — 84100 ASSAY OF PHOSPHORUS: CPT

## 2023-08-02 PROCEDURE — 83880 ASSAY OF NATRIURETIC PEPTIDE: CPT

## 2023-08-02 PROCEDURE — 99284 EMERGENCY DEPT VISIT MOD MDM: CPT

## 2023-08-02 PROCEDURE — 84484 ASSAY OF TROPONIN QUANT: CPT

## 2023-08-02 PROCEDURE — 71045 X-RAY EXAM CHEST 1 VIEW: CPT

## 2023-08-02 PROCEDURE — 85025 COMPLETE CBC W/AUTO DIFF WBC: CPT

## 2023-08-02 PROCEDURE — 36415 COLL VENOUS BLD VENIPUNCTURE: CPT

## 2023-08-02 PROCEDURE — 80061 LIPID PANEL: CPT

## 2023-08-02 PROCEDURE — A4216 STERILE WATER/SALINE, 10 ML: HCPCS

## 2023-08-02 PROCEDURE — 85379 FIBRIN DEGRADATION QUANT: CPT

## 2023-08-02 PROCEDURE — 82962 GLUCOSE BLOOD TEST: CPT

## 2023-08-02 PROCEDURE — 94668 MNPJ CHEST WALL SBSQ: CPT

## 2023-08-02 RX ADMIN — APIXABAN 5 MG: 5 TABLET, FILM COATED ORAL at 23:58

## 2023-08-02 RX ADMIN — SODIUM CHLORIDE 10 ML: 9 INJECTION, SOLUTION INTRAVENOUS at 14:28

## 2023-08-02 RX ADMIN — SODIUM CHLORIDE, PRESERVATIVE FREE 0 ML: 5 INJECTION INTRAVENOUS at 18:14

## 2023-08-03 VITALS — DIASTOLIC BLOOD PRESSURE: 58 MMHG | SYSTOLIC BLOOD PRESSURE: 114 MMHG | HEART RATE: 82 BPM

## 2023-08-03 VITALS
RESPIRATION RATE: 20 BRPM | DIASTOLIC BLOOD PRESSURE: 63 MMHG | OXYGEN SATURATION: 96 % | HEART RATE: 87 BPM | SYSTOLIC BLOOD PRESSURE: 134 MMHG

## 2023-08-03 VITALS
SYSTOLIC BLOOD PRESSURE: 141 MMHG | RESPIRATION RATE: 19 BRPM | DIASTOLIC BLOOD PRESSURE: 73 MMHG | HEART RATE: 91 BPM | TEMPERATURE: 98.3 F | OXYGEN SATURATION: 99 %

## 2023-08-03 VITALS
HEART RATE: 82 BPM | OXYGEN SATURATION: 100 % | DIASTOLIC BLOOD PRESSURE: 56 MMHG | SYSTOLIC BLOOD PRESSURE: 108 MMHG | RESPIRATION RATE: 18 BRPM | TEMPERATURE: 96.7 F

## 2023-08-03 VITALS
RESPIRATION RATE: 18 BRPM | OXYGEN SATURATION: 97 % | DIASTOLIC BLOOD PRESSURE: 67 MMHG | SYSTOLIC BLOOD PRESSURE: 113 MMHG | HEART RATE: 79 BPM

## 2023-08-03 VITALS
DIASTOLIC BLOOD PRESSURE: 72 MMHG | SYSTOLIC BLOOD PRESSURE: 133 MMHG | OXYGEN SATURATION: 99 % | RESPIRATION RATE: 17 BRPM | HEART RATE: 76 BPM

## 2023-08-03 VITALS
OXYGEN SATURATION: 99 % | SYSTOLIC BLOOD PRESSURE: 124 MMHG | DIASTOLIC BLOOD PRESSURE: 57 MMHG | RESPIRATION RATE: 16 BRPM | HEART RATE: 93 BPM

## 2023-08-03 VITALS
OXYGEN SATURATION: 96 % | HEART RATE: 87 BPM | SYSTOLIC BLOOD PRESSURE: 109 MMHG | RESPIRATION RATE: 12 BRPM | DIASTOLIC BLOOD PRESSURE: 48 MMHG

## 2023-08-03 VITALS
RESPIRATION RATE: 18 BRPM | OXYGEN SATURATION: 95 % | SYSTOLIC BLOOD PRESSURE: 120 MMHG | HEART RATE: 94 BPM | DIASTOLIC BLOOD PRESSURE: 58 MMHG

## 2023-08-03 VITALS — OXYGEN SATURATION: 93 %

## 2023-08-03 VITALS — SYSTOLIC BLOOD PRESSURE: 121 MMHG | DIASTOLIC BLOOD PRESSURE: 47 MMHG | HEART RATE: 88 BPM

## 2023-08-03 VITALS
OXYGEN SATURATION: 97 % | SYSTOLIC BLOOD PRESSURE: 124 MMHG | DIASTOLIC BLOOD PRESSURE: 69 MMHG | RESPIRATION RATE: 21 BRPM | HEART RATE: 80 BPM

## 2023-08-03 VITALS
OXYGEN SATURATION: 96 % | RESPIRATION RATE: 20 BRPM | HEART RATE: 82 BPM | SYSTOLIC BLOOD PRESSURE: 110 MMHG | DIASTOLIC BLOOD PRESSURE: 72 MMHG

## 2023-08-03 VITALS
SYSTOLIC BLOOD PRESSURE: 119 MMHG | DIASTOLIC BLOOD PRESSURE: 64 MMHG | HEART RATE: 79 BPM | RESPIRATION RATE: 23 BRPM | OXYGEN SATURATION: 98 %

## 2023-08-03 VITALS — DIASTOLIC BLOOD PRESSURE: 60 MMHG | SYSTOLIC BLOOD PRESSURE: 111 MMHG | HEART RATE: 80 BPM

## 2023-08-03 VITALS
HEART RATE: 75 BPM | SYSTOLIC BLOOD PRESSURE: 100 MMHG | RESPIRATION RATE: 19 BRPM | DIASTOLIC BLOOD PRESSURE: 61 MMHG | OXYGEN SATURATION: 100 %

## 2023-08-03 VITALS
DIASTOLIC BLOOD PRESSURE: 55 MMHG | SYSTOLIC BLOOD PRESSURE: 134 MMHG | HEART RATE: 75 BPM | RESPIRATION RATE: 18 BRPM | OXYGEN SATURATION: 98 %

## 2023-08-03 VITALS
DIASTOLIC BLOOD PRESSURE: 76 MMHG | TEMPERATURE: 96.1 F | RESPIRATION RATE: 22 BRPM | OXYGEN SATURATION: 98 % | HEART RATE: 85 BPM | SYSTOLIC BLOOD PRESSURE: 133 MMHG

## 2023-08-03 VITALS
SYSTOLIC BLOOD PRESSURE: 117 MMHG | TEMPERATURE: 98.2 F | DIASTOLIC BLOOD PRESSURE: 63 MMHG | RESPIRATION RATE: 20 BRPM | HEART RATE: 96 BPM | OXYGEN SATURATION: 96 %

## 2023-08-03 VITALS
SYSTOLIC BLOOD PRESSURE: 120 MMHG | OXYGEN SATURATION: 94 % | DIASTOLIC BLOOD PRESSURE: 65 MMHG | TEMPERATURE: 96.98 F | RESPIRATION RATE: 18 BRPM | HEART RATE: 100 BPM

## 2023-08-03 VITALS
RESPIRATION RATE: 19 BRPM | HEART RATE: 97 BPM | DIASTOLIC BLOOD PRESSURE: 62 MMHG | OXYGEN SATURATION: 98 % | TEMPERATURE: 96.98 F | SYSTOLIC BLOOD PRESSURE: 121 MMHG

## 2023-08-03 VITALS — SYSTOLIC BLOOD PRESSURE: 124 MMHG | HEART RATE: 91 BPM | DIASTOLIC BLOOD PRESSURE: 56 MMHG

## 2023-08-03 VITALS — DIASTOLIC BLOOD PRESSURE: 60 MMHG | HEART RATE: 99 BPM | SYSTOLIC BLOOD PRESSURE: 124 MMHG

## 2023-08-03 VITALS
DIASTOLIC BLOOD PRESSURE: 65 MMHG | HEART RATE: 100 BPM | SYSTOLIC BLOOD PRESSURE: 120 MMHG | OXYGEN SATURATION: 94 % | RESPIRATION RATE: 18 BRPM

## 2023-08-03 VITALS — DIASTOLIC BLOOD PRESSURE: 60 MMHG | HEART RATE: 105 BPM | SYSTOLIC BLOOD PRESSURE: 105 MMHG

## 2023-08-03 VITALS — HEART RATE: 120 BPM

## 2023-08-03 VITALS — OXYGEN SATURATION: 95 %

## 2023-08-03 VITALS — HEART RATE: 93 BPM

## 2023-08-03 VITALS — HEART RATE: 100 BPM

## 2023-08-03 LAB
ANION GAP: 3 (ref 5–15)
BUN SERPL-MCNC: 26 MG/DL (ref 7–18)
BUN/CREAT RATIO: 14.4 RATIO (ref 10–20)
CALCIUM SERPL-MCNC: 9.1 MG/DL (ref 8.5–10.1)
CARBON DIOXIDE: 29 MMOL/L (ref 21–32)
CHLORIDE: 102 MMOL/L (ref 98–107)
CHOLEST SERPL-MCNC: 66 MG/DL
DEPRECATED RDW RBC: 46.5 FL (ref 35.1–43.9)
ERYTHROCYTE [DISTWIDTH] IN BLOOD: 14.3 % (ref 11.6–14.6)
EST GLOM FILT RATE - AFR AMER: 36 ML/MIN (ref 60–?)
ESTIMATED CREATININE CLEARANCE: 29.49 ML/MIN
GLUCOSE: 182 MG/DL (ref 74–106)
HCT VFR BLD AUTO: 31.8 % (ref 37–47)
HEMOGLOBIN: 10 G/DL (ref 12–15)
HGB BLD-MCNC: 10 G/DL (ref 12–15)
IMMATURE GRANULOCYTES COUNT: 0.03 X10^3/UL (ref 0–0)
MCV RBC: 90.6 FL (ref 81–99)
MEAN CORP HGB CONC: 31.4 G/DL (ref 32–36)
MEAN PLATELET VOL.: 10.2 FL (ref 6.2–12)
NRBC FLAGGED BY ANALYZER: 0 % (ref 0–5)
PLATELET # BLD: 177 K/MM3 (ref 150–450)
PLATELET COUNT: 177 K/MM3 (ref 150–450)
POTASSIUM: 4.6 MMOL/L (ref 3.5–5.1)
RBC # BLD AUTO: 3.51 M/MM3 (ref 4.2–5.4)
RBC DISTRIBUTION WIDTH CV: 14.3 % (ref 11.6–14.6)
RBC DISTRIBUTION WIDTH SD: 46.5 FL (ref 35.1–43.9)
TRIGLYCERIDES: 80 MG/DL
VLDLC SERPL-MCNC: 16 MG/DL (ref 5–40)
WBC # BLD AUTO: 7.7 K/MM3 (ref 4.4–11)
WHITE BLOOD COUNT: 7.7 K/MM3 (ref 4.4–11)

## 2023-08-03 RX ADMIN — Medication 25 MCG: at 09:30

## 2023-08-03 RX ADMIN — ASPIRIN 81 MG: 81 TABLET, COATED ORAL at 07:56

## 2023-08-03 RX ADMIN — APIXABAN 5 MG: 5 TABLET, FILM COATED ORAL at 09:27

## 2023-08-03 RX ADMIN — LINAGLIPTIN 5 MG: 5 TABLET, FILM COATED ORAL at 09:30

## 2023-08-03 RX ADMIN — SODIUM CHLORIDE, PRESERVATIVE FREE 0 ML: 5 INJECTION INTRAVENOUS at 09:30

## 2023-08-03 RX ADMIN — APIXABAN 5 MG: 5 TABLET, FILM COATED ORAL at 22:32

## 2023-08-04 VITALS — DIASTOLIC BLOOD PRESSURE: 59 MMHG | HEART RATE: 95 BPM | SYSTOLIC BLOOD PRESSURE: 97 MMHG

## 2023-08-04 VITALS
RESPIRATION RATE: 18 BRPM | OXYGEN SATURATION: 95 % | SYSTOLIC BLOOD PRESSURE: 98 MMHG | HEART RATE: 77 BPM | TEMPERATURE: 98.2 F | DIASTOLIC BLOOD PRESSURE: 59 MMHG

## 2023-08-04 VITALS
RESPIRATION RATE: 17 BRPM | HEART RATE: 83 BPM | SYSTOLIC BLOOD PRESSURE: 137 MMHG | TEMPERATURE: 98.24 F | DIASTOLIC BLOOD PRESSURE: 56 MMHG | OXYGEN SATURATION: 96 %

## 2023-08-04 VITALS — DIASTOLIC BLOOD PRESSURE: 52 MMHG | SYSTOLIC BLOOD PRESSURE: 118 MMHG | HEART RATE: 78 BPM

## 2023-08-04 VITALS — OXYGEN SATURATION: 90 %

## 2023-08-04 VITALS — OXYGEN SATURATION: 95 %

## 2023-08-04 VITALS — OXYGEN SATURATION: 96 %

## 2023-08-04 PROBLEM — I50.33 ACUTE ON CHRONIC DIASTOLIC HEART FAILURE (MULTI): Status: ACTIVE | Noted: 2023-08-03

## 2023-08-04 LAB
ANION GAP: 7 (ref 5–15)
BUN SERPL-MCNC: 38 MG/DL (ref 7–18)
BUN/CREAT RATIO: 16.7 RATIO (ref 10–20)
CALCIUM SERPL-MCNC: 8.9 MG/DL (ref 8.5–10.1)
CARBON DIOXIDE: 24 MMOL/L (ref 21–32)
CHLORIDE: 101 MMOL/L (ref 98–107)
DEPRECATED RDW RBC: 45.9 FL (ref 35.1–43.9)
ERYTHROCYTE [DISTWIDTH] IN BLOOD: 14 % (ref 11.6–14.6)
EST GLOM FILT RATE - AFR AMER: 28 ML/MIN (ref 60–?)
ESTIMATED CREATININE CLEARANCE: 23.28 ML/MIN
GLUCOSE: 194 MG/DL (ref 74–106)
HCT VFR BLD AUTO: 31.2 % (ref 37–47)
HEMOGLOBIN: 9.9 G/DL (ref 12–15)
HGB BLD-MCNC: 9.9 G/DL (ref 12–15)
IMMATURE GRANULOCYTES COUNT: 0.03 X10^3/UL (ref 0–0)
MCV RBC: 90.2 FL (ref 81–99)
MEAN CORP HGB CONC: 31.7 G/DL (ref 32–36)
MEAN PLATELET VOL.: 10.8 FL (ref 6.2–12)
NRBC FLAGGED BY ANALYZER: 0 % (ref 0–5)
PLATELET # BLD: 176 K/MM3 (ref 150–450)
PLATELET COUNT: 176 K/MM3 (ref 150–450)
POTASSIUM: 4 MMOL/L (ref 3.5–5.1)
RBC # BLD AUTO: 3.46 M/MM3 (ref 4.2–5.4)
RBC DISTRIBUTION WIDTH CV: 14 % (ref 11.6–14.6)
RBC DISTRIBUTION WIDTH SD: 45.9 FL (ref 35.1–43.9)
WBC # BLD AUTO: 8.4 K/MM3 (ref 4.4–11)
WHITE BLOOD COUNT: 8.4 K/MM3 (ref 4.4–11)

## 2023-08-04 RX ORDER — FUROSEMIDE 40 MG/1
40 TABLET ORAL DAILY
COMMUNITY
Start: 2023-08-01

## 2023-08-04 RX ORDER — DILTIAZEM HYDROCHLORIDE 30 MG/1
30 TABLET, FILM COATED ORAL 4 TIMES DAILY
COMMUNITY
Start: 2023-08-04 | End: 2023-08-22 | Stop reason: SDUPTHER

## 2023-08-04 RX ADMIN — LINAGLIPTIN 5 MG: 5 TABLET, FILM COATED ORAL at 09:00

## 2023-08-04 RX ADMIN — APIXABAN 5 MG: 5 TABLET, FILM COATED ORAL at 09:00

## 2023-08-04 RX ADMIN — ASPIRIN 81 MG: 81 TABLET, COATED ORAL at 09:00

## 2023-08-04 RX ADMIN — SODIUM CHLORIDE, PRESERVATIVE FREE 0 ML: 5 INJECTION INTRAVENOUS at 09:01

## 2023-08-04 RX ADMIN — Medication 25 MCG: at 09:00

## 2023-08-07 ENCOUNTER — PATIENT OUTREACH (OUTPATIENT)
Dept: CARE COORDINATION | Facility: CLINIC | Age: 68
End: 2023-08-07
Payer: MEDICARE

## 2023-08-08 ENCOUNTER — PATIENT OUTREACH (OUTPATIENT)
Dept: CARE COORDINATION | Facility: CLINIC | Age: 68
End: 2023-08-08
Payer: MEDICARE

## 2023-08-08 NOTE — PROGRESS NOTES
Discharge Facility: [facility]  Discharge Diagnosis: afib RVR  Admission Date: 8/2/23  Discharge Date: 8/3/23    PCP Appointment Date: 8/16/23  Specialist Appointment Date: Early Sept cardiology appt  Hospital Encounter and Summary: Linked  See discharge assessment below for further details    Engagement  Call Start Time: 1209 (8/8/2023 12:12 PM)    Medications  Medications reviewed with patient/caregiver?: Yes (8/8/2023 12:12 PM)  Is the patient having any side effects they believe may be caused by any medication additions or changes?: No (8/8/2023 12:12 PM)  Does the patient have all medications ordered at discharge?: Yes (8/8/2023 12:12 PM)  Care Management Interventions: No intervention needed (8/8/2023 12:12 PM)  Prescription Comments: Patient is no longer on lisinopril, all other medications remain the same (8/8/2023 12:12 PM)  Is the patient taking all medications as directed (includes completed medication regime)?: Yes (8/8/2023 12:12 PM)    Appointments  Does the patient have a primary care provider?: Yes (8/8/2023 12:12 PM)  Care Management Interventions: Verified appointment date/time/provider (8/8/2023 12:12 PM)  Has the patient kept scheduled appointments due by today?: Yes (8/8/2023 12:12 PM)  Care Management Interventions: Educated on importance of keeping appointment (8/8/2023 12:12 PM)    Patient Teaching  Does the patient have access to their discharge instructions?: Yes (8/8/2023 12:12 PM)  Care Management Interventions: Reviewed instructions with patient (8/8/2023 12:12 PM)  What is the patient's perception of their health status since discharge?: Improving (8/8/2023 12:12 PM)  Is the patient/caregiver able to teach back the hierarchy of who to call/visit for symptoms/problems? PCP, Specialist, Home Health nurse, Urgent Care, ED, 911: Yes (8/8/2023 12:12 PM)  Patient/Caregiver Education Comments: She will seek care with any new palpitations or chest pain. (8/8/2023 12:12  PM)    Wrap Up  Wrap Up Additional Comments: She is weak from hospital stay, explained it takes 2-3 days for every day someone is in the hospital to fully recover and gain energy back. She will call with concerns but gradually increase activity as tolerated. (8/8/2023 12:12 PM)  Call End Time: 1212 (8/8/2023 12:12 PM)

## 2023-08-16 ENCOUNTER — OFFICE VISIT (OUTPATIENT)
Dept: PRIMARY CARE | Facility: CLINIC | Age: 68
End: 2023-08-16
Payer: MEDICARE

## 2023-08-16 VITALS
HEART RATE: 79 BPM | SYSTOLIC BLOOD PRESSURE: 110 MMHG | WEIGHT: 159.7 LBS | RESPIRATION RATE: 16 BRPM | OXYGEN SATURATION: 99 % | TEMPERATURE: 97.4 F | HEIGHT: 68 IN | BODY MASS INDEX: 24.2 KG/M2 | DIASTOLIC BLOOD PRESSURE: 66 MMHG

## 2023-08-16 DIAGNOSIS — I10 BENIGN ESSENTIAL HYPERTENSION: ICD-10-CM

## 2023-08-16 DIAGNOSIS — I48.0 PAROXYSMAL ATRIAL FIBRILLATION (MULTI): ICD-10-CM

## 2023-08-16 DIAGNOSIS — I25.5 ISCHEMIC CARDIOMYOPATHY: ICD-10-CM

## 2023-08-16 DIAGNOSIS — I50.9 ACUTE ON CHRONIC HEART FAILURE, UNSPECIFIED HEART FAILURE TYPE (MULTI): ICD-10-CM

## 2023-08-16 DIAGNOSIS — Z09 HOSPITAL DISCHARGE FOLLOW-UP: Primary | ICD-10-CM

## 2023-08-16 PROBLEM — I48.91 ATRIAL FIBRILLATION WITH RVR (MULTI): Status: RESOLVED | Noted: 2023-07-31 | Resolved: 2023-08-16

## 2023-08-16 PROBLEM — I50.33 ACUTE ON CHRONIC DIASTOLIC HEART FAILURE (MULTI): Status: RESOLVED | Noted: 2023-08-03 | Resolved: 2023-08-16

## 2023-08-16 PROCEDURE — 1036F TOBACCO NON-USER: CPT | Performed by: FAMILY MEDICINE

## 2023-08-16 PROCEDURE — 3078F DIAST BP <80 MM HG: CPT | Performed by: FAMILY MEDICINE

## 2023-08-16 PROCEDURE — 3074F SYST BP LT 130 MM HG: CPT | Performed by: FAMILY MEDICINE

## 2023-08-16 PROCEDURE — 1159F MED LIST DOCD IN RCRD: CPT | Performed by: FAMILY MEDICINE

## 2023-08-16 PROCEDURE — 1160F RVW MEDS BY RX/DR IN RCRD: CPT | Performed by: FAMILY MEDICINE

## 2023-08-16 PROCEDURE — 99214 OFFICE O/P EST MOD 30 MIN: CPT | Performed by: FAMILY MEDICINE

## 2023-08-16 PROCEDURE — 3066F NEPHROPATHY DOC TX: CPT | Performed by: FAMILY MEDICINE

## 2023-08-16 ASSESSMENT — ENCOUNTER SYMPTOMS
FATIGUE: 0
FEVER: 0
CHILLS: 0
COUGH: 0
SHORTNESS OF BREATH: 0
PALPITATIONS: 0

## 2023-08-16 ASSESSMENT — PATIENT HEALTH QUESTIONNAIRE - PHQ9
2. FEELING DOWN, DEPRESSED OR HOPELESS: NOT AT ALL
SUM OF ALL RESPONSES TO PHQ9 QUESTIONS 1 AND 2: 0
1. LITTLE INTEREST OR PLEASURE IN DOING THINGS: NOT AT ALL

## 2023-08-16 NOTE — PROGRESS NOTES
"Subjective   Patient ID: Carrie Newton is a 68 y.o. female who presents for Hospital Follow-up.    HPI     The patient is here for a hospital follow up.  Hospital records were reviewed prior to visit, including relevant labs, imaging findings, consultant notes, and discharge summary.  Medications were reconciled and are current, reviewed today.    She was admitted 8/2 - 8/4 for A fib with RVR.    She was started on a diltiazem drip and switched to 30 mg q6 at discharge.  She was started on eliquis.  She is still on carvedilol 25 mg BID.  Lisinopril was stopped.    Echo normal EF  Seeing cardiology- Dr. Fadi Felder  - scheduled in Sept for follow up.   She is feeling much better, no longer having dyspnea   No chest pain or palpitations       Review of Systems   Constitutional:  Negative for chills, fatigue and fever.   Respiratory:  Negative for cough and shortness of breath.    Cardiovascular:  Negative for chest pain and palpitations.       Objective   /66 (BP Location: Right arm, Patient Position: Sitting, BP Cuff Size: Adult)   Pulse 79   Temp 36.3 °C (97.4 °F) (Temporal)   Resp 16   Ht 1.727 m (5' 8\")   Wt 72.4 kg (159 lb 11.2 oz)   SpO2 99%   BMI 24.28 kg/m²     Physical Exam    Constitutional: Well developed, well nourished, alert and in no acute distress   Eyes: Normal external exam. Pupils equally round and reactive to light with normal accommodation and extraocular movements intact.  Neck: Supple, no lymphadenopathy or masses.   Cardiovascular: Regular rate and rhythm, normal S1 and S2, no murmurs, gallops, or rubs. Radial pulses normal. No peripheral edema.  Pulmonary: No respiratory distress, lungs clear to auscultation bilaterally. No wheezes, rhonchi, rales.  Skin: Warm, well perfused, normal skin turgor and color.   Neurologic: Cranial nerves II-XII grossly intact.   Psychiatric: Mood calm and affect normal.    Assessment/Plan   Problem List Items Addressed This Visit       Benign " essential hypertension    Ischemic cardiomyopathy    Paroxysmal atrial fibrillation (CMS/HCC)     Continue eliquis for stroke prevention   Continue carvedilol and diltiazem  Follow up with Dr. Deniseori         Acute on chronic heart failure (CMS/HCC)     Other Visit Diagnoses       Hospital discharge follow-up    -  Primary          A total of 30-39 minutes were spent on this patient encounter  Rula Ferrell DO  8/16/2023

## 2023-08-16 NOTE — ASSESSMENT & PLAN NOTE
Continue eliquis for stroke prevention   Continue carvedilol and diltiazem  Follow up with Dr. Fadi Felder

## 2023-08-17 LAB
ANION GAP IN SER/PLAS: 17 MMOL/L (ref 10–20)
CALCIUM (MG/DL) IN SER/PLAS: 10.3 MG/DL (ref 8.6–10.6)
CARBON DIOXIDE, TOTAL (MMOL/L) IN SER/PLAS: 26 MMOL/L (ref 21–32)
CHLORIDE (MMOL/L) IN SER/PLAS: 98 MMOL/L (ref 98–107)
CREATININE (MG/DL) IN SER/PLAS: 1.85 MG/DL (ref 0.5–1.05)
GFR FEMALE: 29 ML/MIN/1.73M2
GLUCOSE (MG/DL) IN SER/PLAS: 239 MG/DL (ref 74–99)
POTASSIUM (MMOL/L) IN SER/PLAS: 4.4 MMOL/L (ref 3.5–5.3)
SODIUM (MMOL/L) IN SER/PLAS: 137 MMOL/L (ref 136–145)
UREA NITROGEN (MG/DL) IN SER/PLAS: 40 MG/DL (ref 6–23)

## 2023-08-22 ENCOUNTER — PATIENT OUTREACH (OUTPATIENT)
Dept: CARE COORDINATION | Facility: CLINIC | Age: 68
End: 2023-08-22
Payer: MEDICARE

## 2023-08-22 DIAGNOSIS — I48.0 PAROXYSMAL ATRIAL FIBRILLATION (MULTI): Primary | ICD-10-CM

## 2023-08-22 RX ORDER — DILTIAZEM HYDROCHLORIDE 30 MG/1
30 TABLET, FILM COATED ORAL 4 TIMES DAILY
Qty: 120 TABLET | Refills: 1 | Status: SHIPPED | OUTPATIENT
Start: 2023-08-22 | End: 2023-11-29

## 2023-08-22 NOTE — PROGRESS NOTES
Call regarding appt. with PCP on 8/16/23 after hospitalization.  At time of outreach call the patient feels as if their condition has improved since last visit.  Reviewed the PCP appointment with the pt and addressed any questions or concerns.   She is asking if she may be able to have Dr Ferrell refill her cardizem for a 30 day supply since she is not able to get into see cardiology AYAH Felder until mid-end Sept.

## 2023-09-22 ENCOUNTER — PATIENT OUTREACH (OUTPATIENT)
Dept: CARE COORDINATION | Facility: CLINIC | Age: 68
End: 2023-09-22
Payer: MEDICARE

## 2023-10-26 ENCOUNTER — APPOINTMENT (OUTPATIENT)
Dept: RADIOLOGY | Facility: CLINIC | Age: 68
End: 2023-10-26
Payer: MEDICARE

## 2023-10-26 ENCOUNTER — PATIENT OUTREACH (OUTPATIENT)
Dept: CARE COORDINATION | Facility: CLINIC | Age: 68
End: 2023-10-26
Payer: MEDICARE

## 2023-10-31 ENCOUNTER — APPOINTMENT (OUTPATIENT)
Dept: PRIMARY CARE | Facility: CLINIC | Age: 68
End: 2023-10-31
Payer: MEDICARE

## 2023-10-31 NOTE — PROGRESS NOTES
"Subjective   Patient ID: Carrie Newton is a 68 y.o. female who presents for No chief complaint on file..    HPI     A fib- on diltiazem, eliquis, carvedilol- Dr. Fadi Felder    CAD- had a VF cardiac arrest/ STEMI in Feb 20[place] at the airport in [place] and had 6 stents placed  Repeat echo done June 2022- EF 50-55%, moderate MR, hypokinesis of basal inferolateral segment   She is on aspirin, brilinta, lipitor, carvedilol          The patient presents for a diabetes visit.   Last A1c: 8.5 in July   Medication regimen includes: januvia, metformin, jardiance   Blood sugar checks and log reviewed: reports they are \"high\"   Have you seen your eye doctor this year? no, will schedule   Peripheral neuropathy: no   Diet: has not been good   Exercise: walking   Tobacco use: never   Denies chest pain, palpitations, edema, vision changes, confusion, weakness, polyphagia, polydipsia, polyuria, nausea, vomiting, abdominal pain, paresthesias, changes in urination    HPL- on lipitor      Mammogram due   Cologuard neg 2021      CKD stage 3- seeing nephro - Dr. Lazcano      Low TSH- referred to endocrine - scheduled 8/25/23     A1c bmp      Review of Systems    Objective   There were no vitals taken for this visit.    Physical Exam    Assessment/Plan   {Assess/PlanSmartLinks:44117}       "

## 2023-11-28 DIAGNOSIS — I48.0 PAROXYSMAL ATRIAL FIBRILLATION (MULTI): ICD-10-CM

## 2023-11-29 RX ORDER — DILTIAZEM HYDROCHLORIDE 30 MG/1
30 TABLET, FILM COATED ORAL 4 TIMES DAILY
Qty: 120 TABLET | Refills: 1 | Status: SHIPPED | OUTPATIENT
Start: 2023-11-29 | End: 2024-03-27

## 2023-12-14 ENCOUNTER — APPOINTMENT (OUTPATIENT)
Dept: RADIOLOGY | Facility: CLINIC | Age: 68
End: 2023-12-14
Payer: MEDICARE

## 2023-12-14 ENCOUNTER — ANCILLARY PROCEDURE (OUTPATIENT)
Dept: RADIOLOGY | Facility: CLINIC | Age: 68
End: 2023-12-14
Payer: MEDICARE

## 2023-12-14 DIAGNOSIS — Z12.31 ENCOUNTER FOR SCREENING MAMMOGRAM FOR MALIGNANT NEOPLASM OF BREAST: ICD-10-CM

## 2023-12-14 DIAGNOSIS — E11.9 CONTROLLED TYPE 2 DIABETES MELLITUS WITHOUT COMPLICATION, WITHOUT LONG-TERM CURRENT USE OF INSULIN (MULTI): ICD-10-CM

## 2023-12-14 PROCEDURE — 77067 SCR MAMMO BI INCL CAD: CPT | Performed by: RADIOLOGY

## 2023-12-14 PROCEDURE — 77063 BREAST TOMOSYNTHESIS BI: CPT | Performed by: RADIOLOGY

## 2023-12-14 PROCEDURE — 77067 SCR MAMMO BI INCL CAD: CPT

## 2023-12-15 ENCOUNTER — APPOINTMENT (OUTPATIENT)
Dept: ENDOCRINOLOGY | Facility: CLINIC | Age: 68
End: 2023-12-15
Payer: MEDICARE

## 2024-01-04 ENCOUNTER — HOSPITAL ENCOUNTER (OUTPATIENT)
Dept: HOSPITAL 100 - LAB | Age: 69
Discharge: HOME | End: 2024-01-04
Payer: MEDICARE

## 2024-01-04 DIAGNOSIS — R53.83: Primary | ICD-10-CM

## 2024-01-04 DIAGNOSIS — E55.9: ICD-10-CM

## 2024-01-04 LAB
ANION GAP: 4 (ref 5–15)
BUN SERPL-MCNC: 27 MG/DL (ref 7–18)
BUN/CREAT RATIO: 16.8 RATIO (ref 10–20)
CALCIUM SERPL-MCNC: 9.3 MG/DL (ref 8.5–10.1)
CARBON DIOXIDE: 25 MMOL/L (ref 21–32)
CHLORIDE: 108 MMOL/L (ref 98–107)
DEPRECATED RDW RBC: 47.6 FL (ref 35.1–43.9)
ERYTHROCYTE [DISTWIDTH] IN BLOOD: 14.6 % (ref 11.6–14.6)
EST GLOM FILT RATE - AFR AMER: 41 ML/MIN (ref 60–?)
GLUCOSE: 267 MG/DL (ref 74–106)
HCT VFR BLD AUTO: 38.4 % (ref 37–47)
HGB BLD-MCNC: 12 G/DL (ref 12–15)
IMMATURE GRANULOCYTES COUNT: 0.02 X10^3/UL (ref 0–0)
MCV RBC: 88.7 FL (ref 81–99)
MEAN CORP HGB CONC: 31.3 G/DL (ref 32–36)
MEAN PLATELET VOL.: 10.3 FL (ref 6.2–12)
NRBC FLAGGED BY ANALYZER: 0 % (ref 0–5)
PLATELET # BLD: 176 K/MM3 (ref 150–450)
POTASSIUM: 4.8 MMOL/L (ref 3.5–5.1)
RBC # BLD AUTO: 4.33 M/MM3 (ref 4.2–5.4)
VITAMIN D,25 HYDROXY: 51.8 NG/ML
WBC # BLD AUTO: 6.1 K/MM3 (ref 4.4–11)

## 2024-01-04 PROCEDURE — 85025 COMPLETE CBC W/AUTO DIFF WBC: CPT

## 2024-01-04 PROCEDURE — 80048 BASIC METABOLIC PNL TOTAL CA: CPT

## 2024-01-04 PROCEDURE — 82306 VITAMIN D 25 HYDROXY: CPT

## 2024-01-04 PROCEDURE — 36415 COLL VENOUS BLD VENIPUNCTURE: CPT

## 2024-01-04 PROCEDURE — 84443 ASSAY THYROID STIM HORMONE: CPT

## 2024-01-23 ENCOUNTER — APPOINTMENT (OUTPATIENT)
Dept: PRIMARY CARE | Facility: CLINIC | Age: 69
End: 2024-01-23
Payer: MEDICARE

## 2024-01-29 ENCOUNTER — APPOINTMENT (OUTPATIENT)
Dept: PRIMARY CARE | Facility: CLINIC | Age: 69
End: 2024-01-29
Payer: MEDICARE

## 2024-02-20 ENCOUNTER — HOSPITAL ENCOUNTER (OUTPATIENT)
Age: 69
Discharge: HOME | End: 2024-02-20
Payer: MEDICARE

## 2024-02-20 DIAGNOSIS — R42: Primary | ICD-10-CM

## 2024-02-20 PROCEDURE — 93880 EXTRACRANIAL BILAT STUDY: CPT

## 2024-02-21 ENCOUNTER — OFFICE VISIT (OUTPATIENT)
Dept: PRIMARY CARE | Facility: CLINIC | Age: 69
End: 2024-02-21
Payer: MEDICARE

## 2024-02-21 ENCOUNTER — LAB (OUTPATIENT)
Dept: LAB | Facility: LAB | Age: 69
End: 2024-02-21
Payer: MEDICARE

## 2024-02-21 VITALS
RESPIRATION RATE: 16 BRPM | TEMPERATURE: 97.5 F | BODY MASS INDEX: 25.44 KG/M2 | DIASTOLIC BLOOD PRESSURE: 70 MMHG | OXYGEN SATURATION: 98 % | SYSTOLIC BLOOD PRESSURE: 118 MMHG | WEIGHT: 167.3 LBS | HEART RATE: 95 BPM

## 2024-02-21 DIAGNOSIS — E78.5 HYPERLIPIDEMIA, UNSPECIFIED HYPERLIPIDEMIA TYPE: ICD-10-CM

## 2024-02-21 DIAGNOSIS — E66.3 OVERWEIGHT WITH BODY MASS INDEX (BMI) OF 25 TO 25.9 IN ADULT: ICD-10-CM

## 2024-02-21 DIAGNOSIS — E11.22 CKD STAGE 3 SECONDARY TO DIABETES (MULTI): ICD-10-CM

## 2024-02-21 DIAGNOSIS — E11.9 TYPE 2 DIABETES MELLITUS WITHOUT COMPLICATION, WITHOUT LONG-TERM CURRENT USE OF INSULIN (MULTI): ICD-10-CM

## 2024-02-21 DIAGNOSIS — Z12.11 SCREEN FOR COLON CANCER: ICD-10-CM

## 2024-02-21 DIAGNOSIS — Z11.59 NEED FOR HEPATITIS C SCREENING TEST: ICD-10-CM

## 2024-02-21 DIAGNOSIS — I25.2 HISTORY OF ST ELEVATION MYOCARDIAL INFARCTION (STEMI): ICD-10-CM

## 2024-02-21 DIAGNOSIS — I10 BENIGN ESSENTIAL HYPERTENSION: ICD-10-CM

## 2024-02-21 DIAGNOSIS — I48.0 PAROXYSMAL ATRIAL FIBRILLATION (MULTI): ICD-10-CM

## 2024-02-21 DIAGNOSIS — R80.9 MICROALBUMINURIA: ICD-10-CM

## 2024-02-21 DIAGNOSIS — N18.30 CKD STAGE 3 SECONDARY TO DIABETES (MULTI): ICD-10-CM

## 2024-02-21 DIAGNOSIS — I25.10 CORONARY ARTERY DISEASE INVOLVING NATIVE CORONARY ARTERY OF NATIVE HEART WITHOUT ANGINA PECTORIS: ICD-10-CM

## 2024-02-21 DIAGNOSIS — E55.9 VITAMIN D DEFICIENCY: Primary | ICD-10-CM

## 2024-02-21 PROBLEM — D64.9 ANEMIA: Status: RESOLVED | Noted: 2023-07-30 | Resolved: 2024-02-21

## 2024-02-21 PROBLEM — J90 PLEURAL EFFUSION: Status: RESOLVED | Noted: 2023-08-01 | Resolved: 2024-02-21

## 2024-02-21 PROBLEM — R79.89 ABNORMAL TSH: Status: RESOLVED | Noted: 2023-07-30 | Resolved: 2024-02-21

## 2024-02-21 PROCEDURE — 99214 OFFICE O/P EST MOD 30 MIN: CPT | Performed by: FAMILY MEDICINE

## 2024-02-21 PROCEDURE — 81003 URINALYSIS AUTO W/O SCOPE: CPT

## 2024-02-21 PROCEDURE — 36415 COLL VENOUS BLD VENIPUNCTURE: CPT

## 2024-02-21 PROCEDURE — 84100 ASSAY OF PHOSPHORUS: CPT

## 2024-02-21 PROCEDURE — 82043 UR ALBUMIN QUANTITATIVE: CPT

## 2024-02-21 PROCEDURE — 83036 HEMOGLOBIN GLYCOSYLATED A1C: CPT

## 2024-02-21 PROCEDURE — 1036F TOBACCO NON-USER: CPT | Performed by: FAMILY MEDICINE

## 2024-02-21 PROCEDURE — 3078F DIAST BP <80 MM HG: CPT | Performed by: FAMILY MEDICINE

## 2024-02-21 PROCEDURE — 1160F RVW MEDS BY RX/DR IN RCRD: CPT | Performed by: FAMILY MEDICINE

## 2024-02-21 PROCEDURE — 1159F MED LIST DOCD IN RCRD: CPT | Performed by: FAMILY MEDICINE

## 2024-02-21 PROCEDURE — 3008F BODY MASS INDEX DOCD: CPT | Performed by: FAMILY MEDICINE

## 2024-02-21 PROCEDURE — 82306 VITAMIN D 25 HYDROXY: CPT

## 2024-02-21 PROCEDURE — 3074F SYST BP LT 130 MM HG: CPT | Performed by: FAMILY MEDICINE

## 2024-02-21 PROCEDURE — 83970 ASSAY OF PARATHORMONE: CPT

## 2024-02-21 PROCEDURE — 82570 ASSAY OF URINE CREATININE: CPT

## 2024-02-21 PROCEDURE — 86803 HEPATITIS C AB TEST: CPT

## 2024-02-21 PROCEDURE — 80061 LIPID PANEL: CPT

## 2024-02-21 ASSESSMENT — ENCOUNTER SYMPTOMS
PALPITATIONS: 0
FATIGUE: 0
CHILLS: 0
SHORTNESS OF BREATH: 0
FEVER: 0
COUGH: 0

## 2024-02-21 ASSESSMENT — LIFESTYLE VARIABLES
HOW MANY STANDARD DRINKS CONTAINING ALCOHOL DO YOU HAVE ON A TYPICAL DAY: PATIENT DOES NOT DRINK
HOW OFTEN DO YOU HAVE A DRINK CONTAINING ALCOHOL: NEVER
HOW OFTEN DO YOU HAVE SIX OR MORE DRINKS ON ONE OCCASION: NEVER
AUDIT TOTAL SCORE: 0
HOW OFTEN DURING THE LAST YEAR HAVE YOU HAD A FEELING OF GUILT OR REMORSE AFTER DRINKING: NEVER
HOW OFTEN DURING THE LAST YEAR HAVE YOU FAILED TO DO WHAT WAS NORMALLY EXPECTED FROM YOU BECAUSE OF DRINKING: NEVER
HOW OFTEN DURING THE LAST YEAR HAVE YOU BEEN UNABLE TO REMEMBER WHAT HAPPENED THE NIGHT BEFORE BECAUSE YOU HAD BEEN DRINKING: NEVER
HAS A RELATIVE, FRIEND, DOCTOR, OR ANOTHER HEALTH PROFESSIONAL EXPRESSED CONCERN ABOUT YOUR DRINKING OR SUGGESTED YOU CUT DOWN: NO
AUDIT-C TOTAL SCORE: 0
HOW OFTEN DURING THE LAST YEAR HAVE YOU FOUND THAT YOU WERE NOT ABLE TO STOP DRINKING ONCE YOU HAD STARTED: NEVER
HOW OFTEN DURING THE LAST YEAR HAVE YOU NEEDED AN ALCOHOLIC DRINK FIRST THING IN THE MORNING TO GET YOURSELF GOING AFTER A NIGHT OF HEAVY DRINKING: NEVER
HAVE YOU OR SOMEONE ELSE BEEN INJURED AS A RESULT OF YOUR DRINKING: NO
SKIP TO QUESTIONS 9-10: 1

## 2024-02-21 NOTE — PROGRESS NOTES
"Subjective   Patient ID: Carrie Newton is a 68 y.o. female who presents for Follow-up, Hypertension, Hyperlipidemia, and Diabetes.    HPI     Here for follow up-     A fib- on diltiazem, eliquis, carvedilol- follows with Dr. Fadi Felder  States is still in A fib- gets tired occasionally which she attributes to the A fib   Can tell when she goes in and out of it   CAD- hx VF arrest/ STEMI Feb 2022- s/p 6 stents   Repeat echo done June 2022- EF 50-55%, moderate MR, hypokinesis of basal inferolateral segment  She is on aspirin, lipitor, carvedilol   Off brilinta now   She had carotid U/S done yesterday     DM, type 2-   Last A1c 8.5 in July   She is on januvia, metformin, jardiance   Blood sugar checks and log reviewed: thinks 180s fasting - \"still high\"   Have you seen your eye doctor this year? No- will schedule   Do you see a podiatrist (foot doc)? No   Peripheral neuropathy: No   Diet: not the best past few months   Exercise: walking   Denies chest pain, palpitations, dyspnea, edema, vision changes, confusion, weakness, polyphagia, polydipsia, polyuria, nausea, vomiting, abdominal pain, paresthesias, changes in urination    HPL- lipitor     Mammogram neg 12/2023   Cologuard neg 4/2021     CKD stage 3b- seeing Dr. Lazcano - scheduled in May  GFR 34  - checked in Jan 2024 - scanned to chart     Low TSH- referred to endo- scheduled in May   Most recent TSH came back normal 1.27     Current Outpatient Medications   Medication Sig Dispense Refill    apixaban (Eliquis) 5 mg tablet Take 1 tablet (5 mg) by mouth 2 times a day.      aspirin-calcium carbonate 81 mg-300 mg calcium(777 mg) tablet Aspirin 81 MG TABS  Refills: 0      atorvastatin (Lipitor) 80 mg tablet Take 1 tablet (80 mg) by mouth once daily. 90 tablet 3    blood sugar diagnostic (Blood Glucose Test) strip TEST ONCE DAILY.      carvedilol (Coreg) 25 mg tablet Take 1 tablet (25 mg) by mouth 2 times a day. 180 tablet 3    cholecalciferol (Vitamin D-3) 25 MCG " (1000 UT) capsule Take 1 capsule (25 mcg) by mouth once daily.      dilTIAZem (Cardizem) 30 mg immediate release tablet Take 1 tablet (30 mg) by mouth 4 times a day. (Patient taking differently: Take 1 tablet (30 mg) by mouth 2 times a day.) 120 tablet 1    empagliflozin (Jardiance) 10 mg Take 1 tablet (10 mg) by mouth once daily. 90 tablet 3    ferrous sulfate 325 (65 Fe) MG tablet Take by mouth.      furosemide (Lasix) 40 mg tablet Take 1 tablet (40 mg) by mouth once daily.      metFORMIN (Glucophage) 500 mg tablet Take 1 tablet (500 mg) by mouth every 12 hours. 180 tablet 3    SITagliptin phosphate (Januvia) 50 mg tablet Take 1 tablet (50 mg) by mouth once daily. 90 tablet 3     No current facility-administered medications for this visit.       Review of Systems   Constitutional:  Negative for chills, fatigue and fever.   Respiratory:  Negative for cough and shortness of breath.    Cardiovascular:  Negative for chest pain and palpitations.       Scales reviewed    Patient Health Questionnaire-2 Score: 0 (02/21/24 1224)       Objective   /70 (BP Location: Left arm, Patient Position: Sitting, BP Cuff Size: Adult)   Pulse 95   Temp 36.4 °C (97.5 °F) (Temporal)   Resp 16   Wt 75.9 kg (167 lb 4.8 oz)   SpO2 98%   BMI 25.44 kg/m²     Physical Exam  Cardiovascular:      Pulses:           Dorsalis pedis pulses are 2+ on the right side and 2+ on the left side.   Feet:      Right foot:      Protective Sensation: 4 sites tested.  4 sites sensed.      Skin integrity: Dry skin present. No ulcer.      Left foot:      Protective Sensation: 4 sites tested.  4 sites sensed.      Skin integrity: Dry skin present. No ulcer.         Constitutional: Well developed, well nourished, alert and in no acute distress   Eyes: Normal external exam. Pupils equally round and reactive to light with normal accommodation and extraocular movements intact.  Neck: Supple, no lymphadenopathy or masses.   Cardiovascular: Irregular rate and  rhythm, normal S1 and S2, no murmurs, gallops, or rubs. Radial pulses normal. No peripheral edema.  Pulmonary: No respiratory distress, lungs clear to auscultation bilaterally. No wheezes, rhonchi, rales.  Skin: Warm, well perfused, normal skin turgor and color.   Neurologic: Cranial nerves II-XII grossly intact.   Psychiatric: Mood calm and affect normal.  Feet: Normal monofilament exam bilaterally     Assessment/Plan   Problem List Items Addressed This Visit             ICD-10-CM    Benign essential hypertension I10     Controlled, continue current medication           CAD (coronary artery disease) I25.10     Hx VF cardiac arrest/ STEMI in Feb 2022- has 6 stents- follows with Dr. Fadi Felder- on aspirin, lipitor, carvedilol, lisinopril           CKD stage 3 secondary to diabetes (CMS/HCC) E11.22, N18.30     Follows with Dr. Lazcano           Relevant Orders    Parathyroid Hormone, Intact    Phosphorus    Vitamin D 25-Hydroxy,Total (for eval of Vitamin D levels)    Urinalysis with Reflex Microscopic    Albumin , Urine Random    Diabetes mellitus, type 2 (CMS/HCC) E11.9     Check A1c, goal <7   Continue metformin, jardiance, januvia- may need doses increased          Relevant Orders    Hemoglobin A1C    Follow Up In [facility] - PCP - Established    History of ST elevation myocardial infarction (STEMI) I25.2    Hyperlipidemia E78.5     Continue statin          Relevant Orders    Lipid Panel    Microalbuminuria R80.9    Paroxysmal atrial fibrillation (CMS/HCC) I48.0     Continue eliquis for stroke prevention   Continue carvedilol and diltiazem  Follow up with Dr. Fadi Felder          Vitamin D deficiency - Primary E55.9    Overweight with body mass index (BMI) of 25 to 25.9 in adult E66.3, Z68.25     Other Visit Diagnoses         Codes    Need for hepatitis C screening test     Z11.59    Relevant Orders    Hepatitis C Antibody    Screen for colon cancer     Z12.11    Relevant Orders    Cologuard® colon  cancer screening            Follow up 6 months     Rula Ferrell, DO  2/21/2024

## 2024-02-21 NOTE — ASSESSMENT & PLAN NOTE
Hx VF cardiac arrest/ STEMI in Feb 2022- has 6 stents- follows with Dr. Fadi Felder- on aspirin, lipitor, carvedilol, lisinopril

## 2024-02-22 DIAGNOSIS — E11.9 TYPE 2 DIABETES MELLITUS WITHOUT COMPLICATION, WITHOUT LONG-TERM CURRENT USE OF INSULIN (MULTI): ICD-10-CM

## 2024-02-22 DIAGNOSIS — E11.9 CONTROLLED TYPE 2 DIABETES MELLITUS WITHOUT COMPLICATION, WITHOUT LONG-TERM CURRENT USE OF INSULIN (MULTI): ICD-10-CM

## 2024-02-22 PROBLEM — I65.23 BILATERAL CAROTID ARTERY STENOSIS: Status: ACTIVE | Noted: 2024-02-22

## 2024-02-22 LAB
25(OH)D3 SERPL-MCNC: 31 NG/ML (ref 30–100)
APPEARANCE UR: CLEAR
BILIRUB UR STRIP.AUTO-MCNC: NEGATIVE MG/DL
CHOLEST SERPL-MCNC: 84 MG/DL (ref 0–199)
CHOLESTEROL/HDL RATIO: 2
COLOR UR: ABNORMAL
CREAT UR-MCNC: 35.3 MG/DL (ref 20–320)
EST. AVERAGE GLUCOSE BLD GHB EST-MCNC: 220 MG/DL
GLUCOSE UR STRIP.AUTO-MCNC: ABNORMAL MG/DL
HBA1C MFR BLD: 9.3 %
HCV AB SER QL: NONREACTIVE
HDLC SERPL-MCNC: 42.5 MG/DL
KETONES UR STRIP.AUTO-MCNC: NEGATIVE MG/DL
LDLC SERPL CALC-MCNC: 27 MG/DL
LEUKOCYTE ESTERASE UR QL STRIP.AUTO: NEGATIVE
MICROALBUMIN UR-MCNC: 13.4 MG/L
MICROALBUMIN/CREAT UR: 38 UG/MG CREAT
NITRITE UR QL STRIP.AUTO: NEGATIVE
NON HDL CHOLESTEROL: 42 MG/DL (ref 0–149)
PH UR STRIP.AUTO: 5 [PH]
PHOSPHATE SERPL-MCNC: 3.1 MG/DL (ref 2.5–4.9)
PROT UR STRIP.AUTO-MCNC: NEGATIVE MG/DL
PTH-INTACT SERPL-MCNC: 75.1 PG/ML (ref 18.5–88)
RBC # UR STRIP.AUTO: NEGATIVE /UL
SP GR UR STRIP.AUTO: 1.02
TRIGL SERPL-MCNC: 72 MG/DL (ref 0–149)
UROBILINOGEN UR STRIP.AUTO-MCNC: NORMAL MG/DL
VLDL: 14 MG/DL (ref 0–40)

## 2024-02-22 RX ORDER — METFORMIN HYDROCHLORIDE 500 MG/1
1000 TABLET ORAL EVERY 12 HOURS
Qty: 360 TABLET | Refills: 3 | Status: SHIPPED | OUTPATIENT
Start: 2024-02-22 | End: 2025-02-21

## 2024-03-01 ENCOUNTER — TELEPHONE (OUTPATIENT)
Dept: PRIMARY CARE | Facility: CLINIC | Age: 69
End: 2024-03-01
Payer: MEDICARE

## 2024-03-01 NOTE — TELEPHONE ENCOUNTER
Pt called [facility] refill line requesting new RX for Glucose Monitor be sent to [facility].   Monitor on file ([facility]) is FreeStyle Lite.

## 2024-03-03 DIAGNOSIS — E11.9 TYPE 2 DIABETES MELLITUS WITHOUT COMPLICATION, WITHOUT LONG-TERM CURRENT USE OF INSULIN (MULTI): Primary | ICD-10-CM

## 2024-03-03 RX ORDER — DEXTROSE 4 G
TABLET,CHEWABLE ORAL
Qty: 1 EACH | Refills: 0 | Status: SHIPPED | OUTPATIENT
Start: 2024-03-03

## 2024-03-27 ENCOUNTER — TELEPHONE (OUTPATIENT)
Dept: PRIMARY CARE | Facility: CLINIC | Age: 69
End: 2024-03-27
Payer: MEDICARE

## 2024-03-27 DIAGNOSIS — I48.0 PAROXYSMAL ATRIAL FIBRILLATION (MULTI): ICD-10-CM

## 2024-03-27 RX ORDER — DILTIAZEM HYDROCHLORIDE 30 MG/1
30 TABLET, FILM COATED ORAL 4 TIMES DAILY
Qty: 120 TABLET | Refills: 1 | Status: SHIPPED | OUTPATIENT
Start: 2024-03-27 | End: 2024-03-27 | Stop reason: DRUGHIGH

## 2024-03-27 RX ORDER — DILTIAZEM HYDROCHLORIDE 30 MG/1
30 TABLET, FILM COATED ORAL 2 TIMES DAILY
Qty: 120 TABLET | Refills: 1 | Status: SHIPPED | OUTPATIENT
Start: 2024-03-27

## 2024-03-27 NOTE — TELEPHONE ENCOUNTER
Call patient to clarify if her diltiazem dose is 30 mg twice daily or four times daily     I sent it to the pharmacy as four times daily    If this is incorrect it needs sent to me correctly

## 2024-03-27 NOTE — TELEPHONE ENCOUNTER
Spoke with patient, she is taking the Diltiazem 30 mg twice daily.   Since already sent she is asking that we just keep it until she needs a new refill.

## 2024-04-29 DIAGNOSIS — R19.5 POSITIVE COLORECTAL CANCER SCREENING USING COLOGUARD TEST: Primary | ICD-10-CM

## 2024-04-29 LAB — NONINV COLON CA DNA+OCC BLD SCRN STL QL: POSITIVE

## 2024-05-10 ENCOUNTER — APPOINTMENT (OUTPATIENT)
Dept: ENDOCRINOLOGY | Facility: CLINIC | Age: 69
End: 2024-05-10
Payer: MEDICARE

## 2024-05-16 DIAGNOSIS — N18.30 CKD STAGE 3 SECONDARY TO DIABETES (MULTI): ICD-10-CM

## 2024-05-16 DIAGNOSIS — E11.22 CKD STAGE 3 SECONDARY TO DIABETES (MULTI): ICD-10-CM

## 2024-05-20 ENCOUNTER — LAB (OUTPATIENT)
Dept: LAB | Facility: LAB | Age: 69
End: 2024-05-20
Payer: MEDICARE

## 2024-05-20 DIAGNOSIS — E11.22 CKD STAGE 3 SECONDARY TO DIABETES (MULTI): ICD-10-CM

## 2024-05-20 DIAGNOSIS — N18.30 CKD STAGE 3 SECONDARY TO DIABETES (MULTI): ICD-10-CM

## 2024-05-20 PROCEDURE — 82043 UR ALBUMIN QUANTITATIVE: CPT

## 2024-05-20 PROCEDURE — 82570 ASSAY OF URINE CREATININE: CPT

## 2024-05-20 PROCEDURE — 81001 URINALYSIS AUTO W/SCOPE: CPT

## 2024-05-20 PROCEDURE — 80048 BASIC METABOLIC PNL TOTAL CA: CPT

## 2024-05-20 PROCEDURE — 36415 COLL VENOUS BLD VENIPUNCTURE: CPT

## 2024-05-21 LAB
ANION GAP SERPL CALC-SCNC: 17 MMOL/L (ref 10–20)
APPEARANCE UR: CLEAR
BACTERIA #/AREA URNS AUTO: ABNORMAL /HPF
BILIRUB UR STRIP.AUTO-MCNC: NEGATIVE MG/DL
BUN SERPL-MCNC: 29 MG/DL (ref 6–23)
CALCIUM SERPL-MCNC: 9.4 MG/DL (ref 8.6–10.6)
CHLORIDE SERPL-SCNC: 104 MMOL/L (ref 98–107)
CO2 SERPL-SCNC: 22 MMOL/L (ref 21–32)
COLOR UR: COLORLESS
CREAT SERPL-MCNC: 1.51 MG/DL (ref 0.5–1.05)
CREAT UR-MCNC: 21 MG/DL (ref 20–320)
EGFRCR SERPLBLD CKD-EPI 2021: 38 ML/MIN/1.73M*2
GLUCOSE SERPL-MCNC: 196 MG/DL (ref 74–99)
GLUCOSE UR STRIP.AUTO-MCNC: ABNORMAL MG/DL
KETONES UR STRIP.AUTO-MCNC: NEGATIVE MG/DL
LEUKOCYTE ESTERASE UR QL STRIP.AUTO: ABNORMAL
MICROALBUMIN UR-MCNC: 10.9 MG/L
MICROALBUMIN/CREAT UR: 51.9 UG/MG CREAT
MUCOUS THREADS #/AREA URNS AUTO: ABNORMAL /LPF
NITRITE UR QL STRIP.AUTO: NEGATIVE
PH UR STRIP.AUTO: 5 [PH]
POTASSIUM SERPL-SCNC: 5.2 MMOL/L (ref 3.5–5.3)
PROT UR STRIP.AUTO-MCNC: NEGATIVE MG/DL
RBC # UR STRIP.AUTO: NEGATIVE /UL
RBC #/AREA URNS AUTO: ABNORMAL /HPF
SODIUM SERPL-SCNC: 138 MMOL/L (ref 136–145)
SP GR UR STRIP.AUTO: 1.01
SQUAMOUS #/AREA URNS AUTO: ABNORMAL /HPF
UROBILINOGEN UR STRIP.AUTO-MCNC: NORMAL MG/DL
WBC #/AREA URNS AUTO: ABNORMAL /HPF

## 2024-05-21 NOTE — PROGRESS NOTES
Subjective   Patient ID: Carrie Newton is a 69 y.o. female who presents for No chief complaint on file..    HPI     Here for follow up-      A fib- on diltiazem, eliquis, carvedilol- follows with Dr. Fadi Felder  States is still in A fib- gets tired occasionally which she attributes to the A fib   Can tell when she goes in and out of it   CAD- hx VF arrest/ STEMI Feb 2022- s/p 6 stents   Repeat echo June 2022- EF 50-55%, moderate MR, hypokinesis of basal inferolateral segment  She is on aspirin, lipitor, carvedilol   Off brilinta now   She had carotid U/S done which showed mild JANE      DM, type 2-   Last A1c 9.3 in Feb   Metformin 500 mg- 2 tabs twice daily  Jardiance 25 mg- 1 tab daily  Januvia 100 mg- 1 tab daily  Blood sugar checks and log reviewed:   Have you seen your eye doctor this year? No- will schedule   Do you see a podiatrist (foot doc)? No   Peripheral neuropathy: No   Diet: not the best past few months   Exercise: walking   Denies chest pain, palpitations, dyspnea, edema, vision changes, confusion, weakness, polyphagia, polydipsia, polyuria, nausea, vomiting, abdominal pain, paresthesias, changes in urination     HPL- lipitor      Mammogram neg 12/2023   Positive cologuard- referred for colonoscopy-      CKD stage 3b- seeing Dr. Lazcano  GFR 34  - checked in Jan 2024 - scanned to chart      Low TSH- referred to endo-     A1c     Current Outpatient Medications   Medication Sig Dispense Refill    apixaban (Eliquis) 5 mg tablet Take 1 tablet (5 mg) by mouth 2 times a day.      aspirin-calcium carbonate 81 mg-300 mg calcium(777 mg) tablet Aspirin 81 MG TABS  Refills: 0      atorvastatin (Lipitor) 80 mg tablet Take 1 tablet (80 mg) by mouth once daily. 90 tablet 3    blood sugar diagnostic (Blood Glucose Test) strip TEST ONCE DAILY.      blood-glucose meter misc Test once daily 1 each 0    carvedilol (Coreg) 25 mg tablet Take 1 tablet (25 mg) by mouth 2 times a day. 180 tablet 3    cholecalciferol (Vitamin  D-3) 25 MCG (1000 UT) capsule Take 1 capsule (25 mcg) by mouth once daily.      dilTIAZem (Cardizem) 30 mg immediate release tablet Take 1 tablet (30 mg) by mouth 2 times a day. 120 tablet 1    empagliflozin (Jardiance) 25 mg Take 1 tablet (25 mg) by mouth once daily. 90 tablet 3    ferrous sulfate 325 (65 Fe) MG tablet Take by mouth.      furosemide (Lasix) 40 mg tablet Take 1 tablet (40 mg) by mouth once daily.      metFORMIN (Glucophage) 500 mg tablet Take 2 tablets (1,000 mg) by mouth every 12 hours. 360 tablet 3    SITagliptin phosphate (Januvia) 100 mg tablet Take 1 tablet (100 mg) by mouth once daily. 90 tablet 3     No current facility-administered medications for this visit.       Review of Systems      Scales reviewed            Objective   There were no vitals taken for this visit.    Physical Exam    Assessment/Plan   {Assess/PlanSmartLinks:34505}    Rula Ferrell, DO  5/21/2024

## 2024-05-22 ENCOUNTER — APPOINTMENT (OUTPATIENT)
Dept: PRIMARY CARE | Facility: CLINIC | Age: 69
End: 2024-05-22
Payer: MEDICARE

## 2024-05-23 ENCOUNTER — OFFICE VISIT (OUTPATIENT)
Dept: NEPHROLOGY | Facility: CLINIC | Age: 69
End: 2024-05-23
Payer: MEDICARE

## 2024-05-23 VITALS
HEART RATE: 80 BPM | DIASTOLIC BLOOD PRESSURE: 66 MMHG | HEIGHT: 68 IN | SYSTOLIC BLOOD PRESSURE: 114 MMHG | BODY MASS INDEX: 24.89 KG/M2 | WEIGHT: 164.2 LBS

## 2024-05-23 DIAGNOSIS — I10 BENIGN ESSENTIAL HYPERTENSION: ICD-10-CM

## 2024-05-23 DIAGNOSIS — E55.9 VITAMIN D DEFICIENCY: ICD-10-CM

## 2024-05-23 DIAGNOSIS — N18.30 CKD STAGE 3 SECONDARY TO DIABETES (MULTI): Primary | ICD-10-CM

## 2024-05-23 DIAGNOSIS — E11.22 TYPE 2 DIABETES MELLITUS WITH STAGE 3B CHRONIC KIDNEY DISEASE, WITHOUT LONG-TERM CURRENT USE OF INSULIN (MULTI): ICD-10-CM

## 2024-05-23 DIAGNOSIS — N18.32 TYPE 2 DIABETES MELLITUS WITH STAGE 3B CHRONIC KIDNEY DISEASE, WITHOUT LONG-TERM CURRENT USE OF INSULIN (MULTI): ICD-10-CM

## 2024-05-23 DIAGNOSIS — E78.5 HYPERLIPIDEMIA, UNSPECIFIED HYPERLIPIDEMIA TYPE: ICD-10-CM

## 2024-05-23 DIAGNOSIS — E11.22 CKD STAGE 3 SECONDARY TO DIABETES (MULTI): Primary | ICD-10-CM

## 2024-05-23 PROCEDURE — 1036F TOBACCO NON-USER: CPT | Performed by: CLINICAL NURSE SPECIALIST

## 2024-05-23 PROCEDURE — 1160F RVW MEDS BY RX/DR IN RCRD: CPT | Performed by: CLINICAL NURSE SPECIALIST

## 2024-05-23 PROCEDURE — 3078F DIAST BP <80 MM HG: CPT | Performed by: CLINICAL NURSE SPECIALIST

## 2024-05-23 PROCEDURE — 3046F HEMOGLOBIN A1C LEVEL >9.0%: CPT | Performed by: CLINICAL NURSE SPECIALIST

## 2024-05-23 PROCEDURE — 3074F SYST BP LT 130 MM HG: CPT | Performed by: CLINICAL NURSE SPECIALIST

## 2024-05-23 PROCEDURE — 3061F NEG MICROALBUMINURIA REV: CPT | Performed by: CLINICAL NURSE SPECIALIST

## 2024-05-23 PROCEDURE — 1159F MED LIST DOCD IN RCRD: CPT | Performed by: CLINICAL NURSE SPECIALIST

## 2024-05-23 PROCEDURE — 3048F LDL-C <100 MG/DL: CPT | Performed by: CLINICAL NURSE SPECIALIST

## 2024-05-23 PROCEDURE — 3008F BODY MASS INDEX DOCD: CPT | Performed by: CLINICAL NURSE SPECIALIST

## 2024-05-23 PROCEDURE — 99213 OFFICE O/P EST LOW 20 MIN: CPT | Performed by: CLINICAL NURSE SPECIALIST

## 2024-05-23 ASSESSMENT — ENCOUNTER SYMPTOMS
MUSCULOSKELETAL NEGATIVE: 1
PALPITATIONS: 1
FATIGUE: 1
RESPIRATORY NEGATIVE: 1
ENDOCRINE NEGATIVE: 1
GASTROINTESTINAL NEGATIVE: 1
PSYCHIATRIC NEGATIVE: 1
NEUROLOGICAL NEGATIVE: 1

## 2024-05-23 NOTE — PROGRESS NOTES
Subjective   Patient ID: Carrie Newton is a 69 y.o. female who presents for Follow-up (1 year ck/Review labs 5/20).  Follow-up for chronic kidney disease stage IIIb with history of diabetes and hypertension    Labs reviewed  Urinalysis essentially normal positive for glucose, on Jardiance  Microscopic with 6-10 WBCs and 1+ bacteria  Albumin creatinine ratio 51.9  BMP with glucose 196  Sodium 138, potassium 5.2, chloride 104, bicarb 22  Renal functions BUN of 29 and creatinine of 1.51, GFR is 38    She is doing fairly well  She has had atrial fibs since December and is complaining of some fatigue and shortness of breath secondary to this, she does notice this when she is climbing stairs, she also notices that she feels better when she goes back into a regular rhythm  She occasionally has some lower extremity edema and will take a Lasix about once every other week  She is voiding without difficulties          Review of Systems   Constitutional:  Positive for fatigue.   Respiratory: Negative.     Cardiovascular:  Positive for palpitations.   Gastrointestinal: Negative.    Endocrine: Negative.    Genitourinary: Negative.    Musculoskeletal: Negative.    Skin: Negative.    Neurological: Negative.    Psychiatric/Behavioral: Negative.         Objective   Physical Exam  Vitals reviewed.   Constitutional:       Appearance: Normal appearance.   HENT:      Head: Normocephalic.   Cardiovascular:      Rate and Rhythm: Rhythm irregular.   Pulmonary:      Effort: Pulmonary effort is normal.      Breath sounds: Normal breath sounds.   Abdominal:      Palpations: Abdomen is soft.   Musculoskeletal:         General: Normal range of motion.   Skin:     General: Skin is warm and dry.   Neurological:      Mental Status: She is alert and oriented to person, place, and time.   Psychiatric:         Mood and Affect: Mood normal.         Behavior: Behavior normal.         Assessment/Plan   Problem List Items Addressed This Visit              ICD-10-CM    Benign essential hypertension I10     Blood pressure is currently well-controlled on carvedilol and Cardizem         CKD stage 3 secondary to diabetes (Multi) E11.22, N18.30     Renal function stable with creatinine of 1.51, she has been staying within her normal ranges, diabetes is not well-controlled, she is working on this, on Jardiance, blood pressure is well-controlled on carvedilol and Cardizem, not taking nephrotoxic medications         Relevant Orders    Basic metabolic panel    Urinalysis with Reflex Microscopic    Albumin , Urine Random    Follow Up In Nephrology    Diabetes mellitus, type 2 (Multi) E11.9     Blood sugars have been uncontrolled, on metformin, is taking 500 twice a day however her physician did recommend she go up to thousand twice a day, she has not started this yet but will start it today, on Jardiance and Januvia         Relevant Orders    Follow Up In Nephrology    Hyperlipidemia - Primary E78.5    Vitamin D deficiency E55.9     Chronic kidney disease stage III with Baseline 1.3-1.5  Diabetes mellitus type 2  Hypertension on carvedilol and Cardizem  Positive MUNA screen: Centromere antibody  Hyperlipidemia   Coronary artery disease  Atrial fibrillation       Venus Lazcano, TODD-BILL, DNP 05/23/24 9:55 AM

## 2024-05-23 NOTE — ASSESSMENT & PLAN NOTE
Renal function stable with creatinine of 1.51, she has been staying within her normal ranges, diabetes is not well-controlled, she is working on this, on Jardiance, blood pressure is well-controlled on carvedilol and Cardizem, not taking nephrotoxic medications

## 2024-07-20 DIAGNOSIS — E78.5 HYPERLIPIDEMIA, UNSPECIFIED HYPERLIPIDEMIA TYPE: ICD-10-CM

## 2024-07-22 RX ORDER — ATORVASTATIN CALCIUM 80 MG/1
80 TABLET, FILM COATED ORAL DAILY
Qty: 90 TABLET | Refills: 3 | Status: SHIPPED | OUTPATIENT
Start: 2024-07-22 | End: 2025-07-22

## 2024-08-06 DIAGNOSIS — I48.0 PAROXYSMAL ATRIAL FIBRILLATION (MULTI): ICD-10-CM

## 2024-08-06 RX ORDER — DILTIAZEM HYDROCHLORIDE 30 MG/1
30 TABLET, FILM COATED ORAL 4 TIMES DAILY
Qty: 120 TABLET | Refills: 1 | Status: SHIPPED | OUTPATIENT
Start: 2024-08-06

## 2024-08-14 ENCOUNTER — TELEPHONE (OUTPATIENT)
Dept: PRIMARY CARE | Facility: CLINIC | Age: 69
End: 2024-08-14
Payer: MEDICARE

## 2024-08-14 NOTE — TELEPHONE ENCOUNTER
Cologuard can be false positive from precancerous polyps --     Do not recommend repeating as likely would have to pay full cost and still would need colonoscopy once A fib issues addressed

## 2024-08-14 NOTE — TELEPHONE ENCOUNTER
Pt calling said she got a call last week her cologuard test was positive and had to do a colonoscopy and did all the prep and went in to do it and they took her heart rate and was 150 to 170 said she was in A-fib and sent by ambulance to [facility]. She said the dr told her sometimes the cologuard will show a false positive and she wants to know if there is anyway she can try the cologuard again to see if it was correct?

## 2024-08-23 ENCOUNTER — APPOINTMENT (OUTPATIENT)
Dept: PRIMARY CARE | Facility: CLINIC | Age: 69
End: 2024-08-23
Payer: MEDICARE

## 2024-08-30 ENCOUNTER — APPOINTMENT (OUTPATIENT)
Dept: PRIMARY CARE | Facility: CLINIC | Age: 69
End: 2024-08-30
Payer: MEDICARE

## 2024-11-26 ENCOUNTER — APPOINTMENT (OUTPATIENT)
Dept: PRIMARY CARE | Facility: CLINIC | Age: 69
End: 2024-11-26
Payer: MEDICARE

## 2024-11-26 ENCOUNTER — LAB (OUTPATIENT)
Dept: LAB | Facility: LAB | Age: 69
End: 2024-11-26
Payer: MEDICARE

## 2024-11-26 VITALS
TEMPERATURE: 97.7 F | SYSTOLIC BLOOD PRESSURE: 110 MMHG | OXYGEN SATURATION: 98 % | DIASTOLIC BLOOD PRESSURE: 72 MMHG | WEIGHT: 153.7 LBS | HEIGHT: 68 IN | HEART RATE: 117 BPM | BODY MASS INDEX: 23.3 KG/M2 | RESPIRATION RATE: 16 BRPM

## 2024-11-26 DIAGNOSIS — N18.32 TYPE 2 DIABETES MELLITUS WITH STAGE 3B CHRONIC KIDNEY DISEASE, WITHOUT LONG-TERM CURRENT USE OF INSULIN (MULTI): ICD-10-CM

## 2024-11-26 DIAGNOSIS — E78.5 HYPERLIPIDEMIA, UNSPECIFIED HYPERLIPIDEMIA TYPE: ICD-10-CM

## 2024-11-26 DIAGNOSIS — E11.22 CKD STAGE 3 SECONDARY TO DIABETES (MULTI): ICD-10-CM

## 2024-11-26 DIAGNOSIS — E11.22 TYPE 2 DIABETES MELLITUS WITH STAGE 3B CHRONIC KIDNEY DISEASE, WITHOUT LONG-TERM CURRENT USE OF INSULIN (MULTI): ICD-10-CM

## 2024-11-26 DIAGNOSIS — R19.7 DIARRHEA, UNSPECIFIED TYPE: ICD-10-CM

## 2024-11-26 DIAGNOSIS — I25.2 HISTORY OF ST ELEVATION MYOCARDIAL INFARCTION (STEMI): ICD-10-CM

## 2024-11-26 DIAGNOSIS — R19.5 POSITIVE COLORECTAL CANCER SCREENING USING COLOGUARD TEST: ICD-10-CM

## 2024-11-26 DIAGNOSIS — I48.0 PAROXYSMAL ATRIAL FIBRILLATION (MULTI): ICD-10-CM

## 2024-11-26 DIAGNOSIS — Z12.31 SCREENING MAMMOGRAM FOR BREAST CANCER: ICD-10-CM

## 2024-11-26 DIAGNOSIS — N18.30 CKD STAGE 3 SECONDARY TO DIABETES (MULTI): ICD-10-CM

## 2024-11-26 DIAGNOSIS — Z00.00 ROUTINE GENERAL MEDICAL EXAMINATION AT HEALTH CARE FACILITY: Primary | ICD-10-CM

## 2024-11-26 DIAGNOSIS — I10 BENIGN ESSENTIAL HYPERTENSION: ICD-10-CM

## 2024-11-26 DIAGNOSIS — I25.10 CORONARY ARTERY DISEASE INVOLVING NATIVE CORONARY ARTERY OF NATIVE HEART WITHOUT ANGINA PECTORIS: ICD-10-CM

## 2024-11-26 DIAGNOSIS — R80.9 MICROALBUMINURIA: ICD-10-CM

## 2024-11-26 PROBLEM — E66.3 OVERWEIGHT WITH BODY MASS INDEX (BMI) OF 25 TO 25.9 IN ADULT: Status: RESOLVED | Noted: 2024-02-21 | Resolved: 2024-11-26

## 2024-11-26 PROBLEM — I46.9 CARDIAC ARREST: Status: RESOLVED | Noted: 2022-02-26 | Resolved: 2024-11-26

## 2024-11-26 PROBLEM — I42.9 CARDIOMYOPATHY: Status: RESOLVED | Noted: 2024-11-26 | Resolved: 2024-11-26

## 2024-11-26 PROCEDURE — 84443 ASSAY THYROID STIM HORMONE: CPT

## 2024-11-26 PROCEDURE — 36415 COLL VENOUS BLD VENIPUNCTURE: CPT

## 2024-11-26 PROCEDURE — 1123F ACP DISCUSS/DSCN MKR DOCD: CPT | Performed by: FAMILY MEDICINE

## 2024-11-26 PROCEDURE — 3008F BODY MASS INDEX DOCD: CPT | Performed by: FAMILY MEDICINE

## 2024-11-26 PROCEDURE — 3048F LDL-C <100 MG/DL: CPT | Performed by: FAMILY MEDICINE

## 2024-11-26 PROCEDURE — 1159F MED LIST DOCD IN RCRD: CPT | Performed by: FAMILY MEDICINE

## 2024-11-26 PROCEDURE — G0439 PPPS, SUBSEQ VISIT: HCPCS | Performed by: FAMILY MEDICINE

## 2024-11-26 PROCEDURE — 3078F DIAST BP <80 MM HG: CPT | Performed by: FAMILY MEDICINE

## 2024-11-26 PROCEDURE — 1036F TOBACCO NON-USER: CPT | Performed by: FAMILY MEDICINE

## 2024-11-26 PROCEDURE — 1160F RVW MEDS BY RX/DR IN RCRD: CPT | Performed by: FAMILY MEDICINE

## 2024-11-26 PROCEDURE — 1158F ADVNC CARE PLAN TLK DOCD: CPT | Performed by: FAMILY MEDICINE

## 2024-11-26 PROCEDURE — 3074F SYST BP LT 130 MM HG: CPT | Performed by: FAMILY MEDICINE

## 2024-11-26 PROCEDURE — 83036 HEMOGLOBIN GLYCOSYLATED A1C: CPT

## 2024-11-26 PROCEDURE — 3046F HEMOGLOBIN A1C LEVEL >9.0%: CPT | Performed by: FAMILY MEDICINE

## 2024-11-26 PROCEDURE — 1170F FXNL STATUS ASSESSED: CPT | Performed by: FAMILY MEDICINE

## 2024-11-26 PROCEDURE — 3061F NEG MICROALBUMINURIA REV: CPT | Performed by: FAMILY MEDICINE

## 2024-11-26 PROCEDURE — 99214 OFFICE O/P EST MOD 30 MIN: CPT | Performed by: FAMILY MEDICINE

## 2024-11-26 RX ORDER — METFORMIN HYDROCHLORIDE 500 MG/1
1000 TABLET, EXTENDED RELEASE ORAL
Qty: 180 TABLET | Refills: 3 | Status: SHIPPED | OUTPATIENT
Start: 2024-11-26 | End: 2025-11-26

## 2024-11-26 ASSESSMENT — ENCOUNTER SYMPTOMS
NAUSEA: 0
ABDOMINAL PAIN: 0
WEAKNESS: 0
DIZZINESS: 0
HEADACHES: 0
SHORTNESS OF BREATH: 0
CONSTIPATION: 0
DIARRHEA: 0
FATIGUE: 0
MUSCULOSKELETAL NEGATIVE: 1
FEVER: 0
PALPITATIONS: 1
CHILLS: 0
VOMITING: 0
PSYCHIATRIC NEGATIVE: 1
SORE THROAT: 0
COUGH: 0
NUMBNESS: 0

## 2024-11-26 ASSESSMENT — PATIENT HEALTH QUESTIONNAIRE - PHQ9
1. LITTLE INTEREST OR PLEASURE IN DOING THINGS: NOT AT ALL
SUM OF ALL RESPONSES TO PHQ9 QUESTIONS 1 AND 2: 0
2. FEELING DOWN, DEPRESSED OR HOPELESS: NOT AT ALL

## 2024-11-26 ASSESSMENT — ACTIVITIES OF DAILY LIVING (ADL)
TAKING_MEDICATION: INDEPENDENT
MANAGING_FINANCES: INDEPENDENT
DOING_HOUSEWORK: INDEPENDENT
GROCERY_SHOPPING: INDEPENDENT
BATHING: INDEPENDENT
DRESSING: INDEPENDENT

## 2024-11-26 NOTE — ASSESSMENT & PLAN NOTE
Reduce metformin to 1000 mg daily but switch to ER formulation   Continue januvia and jardiance at current dosing for now  Check A1c and will make further dose adjustments     Orders:    Hemoglobin A1C; Future    metFORMIN XR (Glucophage-XR) 500 mg 24 hr tablet; Take 2 tablets (1,000 mg) by mouth once daily in the evening. Take with meals. Do not crush, chew, or split.

## 2024-11-26 NOTE — ASSESSMENT & PLAN NOTE
Hx VF cardiac arrest/ STEMI in Feb 2022- has 6 stents- follows with Dr. Fadi Felder- on aspirin, lipitor, carvedilol

## 2024-11-26 NOTE — ASSESSMENT & PLAN NOTE
Currently in A fib-   Continue eliquis for stroke prevention   Continue carvedilol and diltiazem  Follow up with Dr. Fadi Felder for possible cardioversion vs ablation

## 2024-11-26 NOTE — PROGRESS NOTES
Subjective   Reason for Visit: Carrie Newton is an 69 y.o. female here for a Medicare Wellness visit.     Past Medical, Surgical, and Family History reviewed and updated in chart.    Reviewed all medications by prescribing practitioner or clinical pharmacist (such as prescriptions, OTCs, herbal therapies and supplements) and documented in the medical record.    HPI    HPOA- daughter Mariana MONGE fib- on diltiazem, eliquis, carvedilol- follows with Dr. Felder   CAD- hx VT arrest / STEMI 2022- s/p 6 stents  Echo 2023 EF 50-55%, moderate MR, hypokinesis of basal inferolateral segment  She is on aspirin, statin  She is seeing him next week- no prior ablations or cardioversions but thinks they may be doing a procedure coming up     DM, type 2-   A1c 9.3 in Feb  Metformin 1,000 mg BID  Jardiance 25 mg daily   Januvia 100 mg daily   Having diarrhea daily - not sure if side effect.  Seemed to start after doses were increased last visit.    Eye doctor - no   Podiatry- no   Neuropathy - no     Mammogram due   Positive cologuard- referred for colonoscopy- attempted to do it but got dehydrated and then went into A fib with RVR and was sent to hospital so colonoscopy was never done     CKD stage 3b- Dr. Lazcano    Dexa- declines     Immunizations- declines     Current Outpatient Medications   Medication Sig Dispense Refill    apixaban (Eliquis) 5 mg tablet Take 1 tablet (5 mg) by mouth 2 times a day.      aspirin-calcium carbonate 81 mg-300 mg calcium(777 mg) tablet Aspirin 81 MG TABS  Refills: 0      atorvastatin (Lipitor) 80 mg tablet Take 1 tablet (80 mg) by mouth once daily. 90 tablet 3    blood sugar diagnostic (Blood Glucose Test) strip TEST ONCE DAILY.      blood-glucose meter misc Test once daily 1 each 0    carvedilol (Coreg) 25 mg tablet Take 1 tablet (25 mg) by mouth 2 times a day. 180 tablet 3    cholecalciferol (Vitamin D-3) 25 MCG (1000 UT) capsule Take 1 capsule (25 mcg) by mouth once daily.      dilTIAZem  "(Cardizem) 30 mg immediate release tablet TAKE 1 TABLET BY MOUTH FOUR TIMES DAILY 120 tablet 1    empagliflozin (Jardiance) 25 mg Take 1 tablet (25 mg) by mouth once daily. 90 tablet 3    ferrous sulfate 325 (65 Fe) MG tablet Take by mouth.      furosemide (Lasix) 40 mg tablet Take 1 tablet (40 mg) by mouth once daily.      SITagliptin phosphate (Januvia) 100 mg tablet Take 1 tablet (100 mg) by mouth once daily. 90 tablet 3    metFORMIN XR (Glucophage-XR) 500 mg 24 hr tablet Take 2 tablets (1,000 mg) by mouth once daily in the evening. Take with meals. Do not crush, chew, or split. 180 tablet 3     No current facility-administered medications for this visit.         Patient Care Team:  Rula Ferrell DO as PCP - General  Rula Ferrell DO as PCP [facility] Attributed Provider  Fadi Felder MD as Referring Physician (Cardiology)  Trenton Lazcano DO (Nephrology)     Review of Systems   Constitutional:  Negative for chills, fatigue and fever.   HENT:  Negative for congestion, ear pain and sore throat.    Eyes:  Negative for visual disturbance.   Respiratory:  Negative for cough and shortness of breath.    Cardiovascular:  Positive for palpitations. Negative for chest pain and leg swelling.   Gastrointestinal:  Negative for abdominal pain, constipation, diarrhea, nausea and vomiting.   Genitourinary: Negative.    Musculoskeletal: Negative.    Skin:  Negative for rash.   Neurological:  Negative for dizziness, weakness, numbness and headaches.   Psychiatric/Behavioral: Negative.         Objective   Vitals:  /72 (BP Location: Left arm, Patient Position: Sitting, BP Cuff Size: Adult)   Pulse (!) 117   Temp 36.5 °C (97.7 °F) (Temporal)   Resp 16   Ht 1.727 m (5' 8\")   Wt 69.7 kg (153 lb 11.2 oz)   SpO2 98%   BMI 23.37 kg/m²       Physical Exam    Constitutional: Well developed, well nourished, alert and in no acute distress.  Head and Face: Normocephalic, atraumatic.  Eyes: Normal external " exam. Pupils equally round and reactive to light with normal accommodation and extraocular movements intact.   ENT: External inspection of ears normal, tympanic membranes visualized and normal.   Neck: Supple, no lymphadenopathy or masses. Thyroid not enlarged, no palpable nodules.   Cardiovascular: Irregular rate and rhythm, normal S1 and S2, no murmurs, gallops, or rubs. Radial pulses normal. No peripheral edema. No carotid bruits.   Pulmonary: No respiratory distress, lungs clear to auscultation bilaterally. No wheezes, rhonchi, rales.   Musculoskeletal: Gait normal. Muscle strength/tone normal of all 4 extremities. Normal range of motion of all extremities.   Skin: Warm, well perfused, normal skin turgor and color, no lesions or rashes noted.   Neurologic: Cranial nerves II-XII grossly intact.   Psychiatric: Mood calm and affect normal.    Assessment & Plan  Routine general medical examination at health care facility    Orders:    1 Year Follow Up In [facility] - PCP - Wellness Exam; Future    Paroxysmal atrial fibrillation (Multi)  Currently in A fib-   Continue eliquis for stroke prevention   Continue carvedilol and diltiazem  Follow up with Dr. Fadi Felder for possible cardioversion vs ablation              Hyperlipidemia, unspecified hyperlipidemia type  Continue statin          History of ST elevation myocardial infarction (STEMI)         Coronary artery disease involving native coronary artery of native heart without angina pectoris  Hx VF cardiac arrest/ STEMI in Feb 2022- has 6 stents- follows with Dr. Fadi Felder- on aspirin, lipitor, carvedilol         Benign essential hypertension  Blood pressure is currently well-controlled on carvedilol and Cardizem       Type 2 diabetes mellitus with stage 3b chronic kidney disease, without long-term current use of insulin (Multi)  Reduce metformin to 1000 mg daily but switch to ER formulation   Continue januvia and jardiance at current dosing for  now  Check A1c and will make further dose adjustments     Orders:    Hemoglobin A1C; Future    metFORMIN XR (Glucophage-XR) 500 mg 24 hr tablet; Take 2 tablets (1,000 mg) by mouth once daily in the evening. Take with meals. Do not crush, chew, or split.    Microalbuminuria         CKD stage 3 secondary to diabetes (Multi)  Following with nephrology        Positive colorectal cancer screening using Cologuard test  Discussed still does need colonoscopy once A fib issues are controlled        Diarrhea, unspecified type   Likely due to metformin Check labs and stool studies to rule out other causes Colonoscopy needed as well   Orders:    TSH with reflex to Free T4 if abnormal; Future    Stool Pathogen Panel, PCR; Future    C. difficile, PCR; Future    Calprotectin, Fecal; Future    Occult Blood, Stool; Future    Ova/Para + Giardia/Cryptosporidium Antigen; Future    Screening mammogram for breast cancer    Orders:    BI mammo bilateral screening tomosynthesis; Future          Rula Ferrell DO  11/26/2024

## 2024-11-27 LAB
EST. AVERAGE GLUCOSE BLD GHB EST-MCNC: 174 MG/DL
HBA1C MFR BLD: 7.7 %
TSH SERPL-ACNC: 1.4 MIU/L (ref 0.44–3.98)

## 2024-11-29 DIAGNOSIS — E11.22 TYPE 2 DIABETES MELLITUS WITH STAGE 3B CHRONIC KIDNEY DISEASE, WITHOUT LONG-TERM CURRENT USE OF INSULIN (MULTI): Primary | ICD-10-CM

## 2024-11-29 DIAGNOSIS — N18.32 TYPE 2 DIABETES MELLITUS WITH STAGE 3B CHRONIC KIDNEY DISEASE, WITHOUT LONG-TERM CURRENT USE OF INSULIN (MULTI): Primary | ICD-10-CM

## 2024-12-06 ENCOUNTER — LAB (OUTPATIENT)
Dept: LAB | Facility: LAB | Age: 69
End: 2024-12-06
Payer: MEDICARE

## 2024-12-06 DIAGNOSIS — R19.7 DIARRHEA, UNSPECIFIED TYPE: ICD-10-CM

## 2024-12-06 PROCEDURE — 87328 CRYPTOSPORIDIUM AG IA: CPT

## 2024-12-06 PROCEDURE — 87329 GIARDIA AG IA: CPT

## 2024-12-10 LAB
CRYPTOSP AG STL QL IA: NEGATIVE
G LAMBLIA AG STL QL IA: NEGATIVE

## 2024-12-11 ENCOUNTER — TELEPHONE (OUTPATIENT)
Dept: PRIMARY CARE | Facility: CLINIC | Age: 69
End: 2024-12-11
Payer: MEDICARE

## 2024-12-13 LAB — O+P STL MICRO: NEGATIVE

## 2025-01-06 ENCOUNTER — APPOINTMENT (OUTPATIENT)
Dept: RADIOLOGY | Facility: CLINIC | Age: 70
End: 2025-01-06
Payer: MEDICARE

## 2025-02-04 DIAGNOSIS — I48.0 PAROXYSMAL ATRIAL FIBRILLATION (MULTI): ICD-10-CM

## 2025-02-04 RX ORDER — DILTIAZEM HYDROCHLORIDE 30 MG/1
30 TABLET, FILM COATED ORAL 4 TIMES DAILY
Qty: 120 TABLET | Refills: 5 | Status: SHIPPED | OUTPATIENT
Start: 2025-02-04

## 2025-02-11 ENCOUNTER — APPOINTMENT (OUTPATIENT)
Dept: RADIOLOGY | Facility: CLINIC | Age: 70
End: 2025-02-11
Payer: MEDICARE

## 2025-02-21 DIAGNOSIS — E11.9 TYPE 2 DIABETES MELLITUS WITHOUT COMPLICATION, WITHOUT LONG-TERM CURRENT USE OF INSULIN (MULTI): ICD-10-CM

## 2025-02-21 DIAGNOSIS — E11.9 CONTROLLED TYPE 2 DIABETES MELLITUS WITHOUT COMPLICATION, WITHOUT LONG-TERM CURRENT USE OF INSULIN (MULTI): ICD-10-CM

## 2025-02-21 RX ORDER — SITAGLIPTIN 100 MG/1
100 TABLET, FILM COATED ORAL DAILY
Qty: 90 TABLET | Refills: 1 | Status: SHIPPED | OUTPATIENT
Start: 2025-02-21

## 2025-02-21 RX ORDER — EMPAGLIFLOZIN 25 MG/1
25 TABLET, FILM COATED ORAL DAILY
Qty: 90 TABLET | Refills: 1 | Status: SHIPPED | OUTPATIENT
Start: 2025-02-21

## 2025-03-05 ENCOUNTER — TELEPHONE (OUTPATIENT)
Dept: PRIMARY CARE | Facility: CLINIC | Age: 70
End: 2025-03-05
Payer: MEDICARE

## 2025-03-05 DIAGNOSIS — N18.32 TYPE 2 DIABETES MELLITUS WITH STAGE 3B CHRONIC KIDNEY DISEASE, WITHOUT LONG-TERM CURRENT USE OF INSULIN (MULTI): Primary | ICD-10-CM

## 2025-03-05 DIAGNOSIS — E11.22 TYPE 2 DIABETES MELLITUS WITH STAGE 3B CHRONIC KIDNEY DISEASE, WITHOUT LONG-TERM CURRENT USE OF INSULIN (MULTI): Primary | ICD-10-CM

## 2025-03-05 RX ORDER — SAXAGLIPTIN 2.5 MG/1
2.5 TABLET, FILM COATED ORAL DAILY
Qty: 90 TABLET | Refills: 3 | Status: SHIPPED | OUTPATIENT
Start: 2025-03-05 | End: 2026-03-05

## 2025-03-05 NOTE — TELEPHONE ENCOUNTER
Please call the patient and tell her that insurance will not cover januvia    I will switch this medication to saxagliptin instead     I previously referred her to endocrinology to help with diabetes management-  has she scheduled this appointment yet?  If not, needs to do so.

## 2025-03-06 DIAGNOSIS — E11.9 CONTROLLED TYPE 2 DIABETES MELLITUS WITHOUT COMPLICATION, WITHOUT LONG-TERM CURRENT USE OF INSULIN (MULTI): ICD-10-CM

## 2025-03-06 NOTE — TELEPHONE ENCOUNTER
Pt called in requesting refill on Jardiance 25 mg sent to pharmacy states she's completely out of medication

## 2025-03-19 LAB
ALP SERPL-CCNC: 67 U/L (ref 35–104)
ALT SERPL-CCNC: 18 U/L (ref ?–34)
ANION GAP SERPL CALC-SCNC: 12 MMOL/L (ref 5–15)
AST(SGOT): 25 U/L (ref ?–31)
BILIRUB DIRECT SERPL-MCNC: 0.47 MG/DL (ref 0–0.3)
BUN SERPL-MCNC: 26 MG/DL (ref 4–19)
BUN/CREAT SERPL: 19.1 RATIO (ref 10–20)
CALCIUM SERPL-MCNC: 9.7 MG/DL (ref 7.6–11)
CHLORIDE: 104 MMOL/L (ref 98–108)
CHOLEST SERPL-MCNC: 87 MG/DL (ref ?–200)
CHOLESTEROL:HDL RATIO SCREEN: 1.71
CO2 BLDCV-SCNC: 20.8 MMOL/L (ref 21–32)
GLOBULIN: 3.8 G/DL (ref 2.2–4.2)
GLUCOSE SERPL-MCNC: 197 MG/DL (ref 70–99)
LOW DENSITY LIPOPROTEIN CALC.: 25 MG/DL
POTASSIUM SPEC-MCNC: 4.9 MMOL/L (ref 3.3–5.1)
TRIGLYCERIDES: 58 MG/DL
VLDLC SERPL-MCNC: 12 MG/DL (ref 5–40)

## 2025-04-14 ENCOUNTER — APPOINTMENT (OUTPATIENT)
Dept: ENDOCRINOLOGY | Facility: CLINIC | Age: 70
End: 2025-04-14
Payer: MEDICARE

## 2025-04-16 LAB
ANION GAP SERPL CALC-SCNC: 11 MMOL/L (ref 5–15)
BUN SERPL-MCNC: 21 MG/DL (ref 4–19)
BUN/CREAT SERPL: 14.5 RATIO (ref 10–20)
CALCIUM SERPL-MCNC: 9.7 MG/DL (ref 7.6–11)
CHLORIDE: 104 MMOL/L (ref 98–108)
CO2 BLDCV-SCNC: 22.6 MMOL/L (ref 21–32)
GLUCOSE SERPL-MCNC: 204 MG/DL (ref 70–99)
POTASSIUM SPEC-MCNC: 4.7 MMOL/L (ref 3.3–5.1)

## 2025-04-21 ENCOUNTER — HOSPITAL ENCOUNTER (OUTPATIENT)
Age: 70
Discharge: HOME | End: 2025-04-21
Payer: MEDICARE

## 2025-04-21 DIAGNOSIS — N18.9: ICD-10-CM

## 2025-04-21 DIAGNOSIS — Z79.01: ICD-10-CM

## 2025-04-21 DIAGNOSIS — E78.2: ICD-10-CM

## 2025-04-21 DIAGNOSIS — Z95.5: ICD-10-CM

## 2025-04-21 DIAGNOSIS — Z79.84: ICD-10-CM

## 2025-04-21 DIAGNOSIS — I48.0: Primary | ICD-10-CM

## 2025-04-21 DIAGNOSIS — I13.0: ICD-10-CM

## 2025-04-21 DIAGNOSIS — I50.9: ICD-10-CM

## 2025-04-21 DIAGNOSIS — I25.5: ICD-10-CM

## 2025-04-21 DIAGNOSIS — I34.0: ICD-10-CM

## 2025-04-21 DIAGNOSIS — Z79.899: ICD-10-CM

## 2025-04-21 DIAGNOSIS — Z79.82: ICD-10-CM

## 2025-04-21 DIAGNOSIS — I25.2: ICD-10-CM

## 2025-04-21 DIAGNOSIS — Z82.49: ICD-10-CM

## 2025-04-21 PROCEDURE — 92960 CARDIOVERSION ELECTRIC EXT: CPT

## 2025-04-21 PROCEDURE — 80061 LIPID PANEL: CPT

## 2025-04-21 PROCEDURE — 71046 X-RAY EXAM CHEST 2 VIEWS: CPT

## 2025-04-21 PROCEDURE — 93005 ELECTROCARDIOGRAM TRACING: CPT

## 2025-04-21 PROCEDURE — 36415 COLL VENOUS BLD VENIPUNCTURE: CPT

## 2025-04-21 PROCEDURE — 80076 HEPATIC FUNCTION PANEL: CPT

## 2025-04-21 PROCEDURE — 80048 BASIC METABOLIC PNL TOTAL CA: CPT

## 2025-05-13 ENCOUNTER — TELEPHONE (OUTPATIENT)
Dept: PRIMARY CARE | Facility: CLINIC | Age: 70
End: 2025-05-13
Payer: MEDICARE

## 2025-05-13 DIAGNOSIS — N18.32 TYPE 2 DIABETES MELLITUS WITH STAGE 3B CHRONIC KIDNEY DISEASE, WITHOUT LONG-TERM CURRENT USE OF INSULIN (MULTI): ICD-10-CM

## 2025-05-13 DIAGNOSIS — I10 BENIGN ESSENTIAL HYPERTENSION: ICD-10-CM

## 2025-05-13 DIAGNOSIS — E78.5 HYPERLIPIDEMIA, UNSPECIFIED HYPERLIPIDEMIA TYPE: ICD-10-CM

## 2025-05-13 DIAGNOSIS — I25.10 CORONARY ARTERY DISEASE INVOLVING NATIVE CORONARY ARTERY OF NATIVE HEART WITHOUT ANGINA PECTORIS: ICD-10-CM

## 2025-05-13 DIAGNOSIS — E11.9 CONTROLLED TYPE 2 DIABETES MELLITUS WITHOUT COMPLICATION, WITHOUT LONG-TERM CURRENT USE OF INSULIN: ICD-10-CM

## 2025-05-13 DIAGNOSIS — E11.22 TYPE 2 DIABETES MELLITUS WITH STAGE 3B CHRONIC KIDNEY DISEASE, WITHOUT LONG-TERM CURRENT USE OF INSULIN (MULTI): ICD-10-CM

## 2025-05-13 RX ORDER — METFORMIN HYDROCHLORIDE 500 MG/1
1000 TABLET, EXTENDED RELEASE ORAL
Qty: 60 TABLET | Refills: 0 | Status: SHIPPED | OUTPATIENT
Start: 2025-05-13 | End: 2026-05-13

## 2025-05-13 RX ORDER — SAXAGLIPTIN 2.5 MG/1
2.5 TABLET, FILM COATED ORAL DAILY
Qty: 30 TABLET | Refills: 0 | Status: SHIPPED | OUTPATIENT
Start: 2025-05-13 | End: 2026-05-13

## 2025-05-13 RX ORDER — ATORVASTATIN CALCIUM 80 MG/1
80 TABLET, FILM COATED ORAL DAILY
Qty: 30 TABLET | Refills: 0 | Status: SHIPPED | OUTPATIENT
Start: 2025-05-13 | End: 2026-05-13

## 2025-05-13 RX ORDER — CARVEDILOL 25 MG/1
25 TABLET ORAL 2 TIMES DAILY
Qty: 60 TABLET | Refills: 0 | Status: SHIPPED | OUTPATIENT
Start: 2025-05-13 | End: 2026-05-13

## 2025-05-13 NOTE — TELEPHONE ENCOUNTER
Pended all meds except for Amiodarone to Dr Ferrell and called and left message on patients VM that all other meds she requested were pended to Dr Ferrell to send to the [place] [place] [facility] and that she would have to contact her cardiologist for the Amiodarone med. MW 5-13-25

## 2025-05-22 ENCOUNTER — APPOINTMENT (OUTPATIENT)
Dept: NEPHROLOGY | Facility: CLINIC | Age: 70
End: 2025-05-22
Payer: MEDICARE

## 2025-05-27 ENCOUNTER — APPOINTMENT (OUTPATIENT)
Dept: NEPHROLOGY | Facility: CLINIC | Age: 70
End: 2025-05-27
Payer: MEDICARE

## 2025-05-28 NOTE — PROGRESS NOTES
Subjective   Patient ID: Carrie Newton is a 70 y.o. female who presents for Follow-up (Pt presents for 6 mth fuv. Pt states no concerns to discuss.).    HPI     Here for follow up-     A fib- on eliquis, carvedilol- follows with Dr. Felder  Now off diltiazem    CAD- hx VT arrest / STEMI 2022- s/p 6 stents  Echo 2023 EF 50-55%, moderate MR, hypokinesis of basal inferolateral segment  She is on aspirin, statin, lasix as well   She had a cardioversion 4/21/25 and was started on amiodarone following - states it lasted only for a few hours- and then went back into A fib   Has another cardioversion scheduled 6/24/25      DM, type 2- has not seen endo yet - looking to find someone in [place]    A1c 7.7 in Nov 2024   States needs new glucometer   Metformin 1,000 mg daily- had diarrhea issues with higher dosing   Jardiance 25 mg daily   Onglyza 2.5 mg      Mammogram ordered- not done    Positive cologuard- referred for colonoscopy- attempted to do it but got dehydrated and then went into A fib with RVR and was sent to hospital so colonoscopy was never done - has not rescheduled      CKD stage 3b- Dr. Lazcano     Dexa- declines      Immunizations- declines     Current Medications[1]    Review of Systems   Constitutional:  Negative for chills, fatigue and fever.   HENT:  Negative for congestion, ear pain and sore throat.    Eyes:  Negative for visual disturbance.   Respiratory:  Negative for cough and shortness of breath.    Cardiovascular:  Negative for chest pain, palpitations and leg swelling.   Gastrointestinal:  Negative for abdominal pain, blood in stool, constipation, diarrhea, nausea and vomiting.   Genitourinary: Negative.    Musculoskeletal: Negative.    Skin:  Negative for rash.   Neurological:  Negative for dizziness, weakness, numbness and headaches.   Psychiatric/Behavioral: Negative.         Scales reviewed    Patient Health Questionnaire-2 Score: 0 (05/29/25 1231)       Objective   /70 (BP Location: Left  arm, Patient Position: Sitting, BP Cuff Size: Adult)   Pulse 83   Temp 35.9 °C (96.7 °F) (Temporal)   Resp 12   Wt 68.9 kg (152 lb)   SpO2 97%   BMI 23.11 kg/m²     Physical Exam    Constitutional: Well developed, well nourished, alert and in no acute distress.  Head and Face: Normocephalic, atraumatic.  Eyes: Normal external exam. Pupils equally round and reactive to light with normal accommodation and extraocular movements intact.   Neck: Supple, no lymphadenopathy or masses.   Cardiovascular: Irregular rate and rhythm, normal S1 and S2, no murmurs, gallops, or rubs. Radial pulses normal. No peripheral edema. No carotid bruits.   Pulmonary: No respiratory distress, lungs clear to auscultation bilaterally. No wheezes, rhonchi, rales.   Musculoskeletal: Gait normal. Muscle strength/tone normal of all 4 extremities. Normal range of motion of all extremities.   Skin: Warm, well perfused, normal skin turgor and color, no lesions or rashes noted.   Neurologic: Cranial nerves II-XII grossly intact.   Psychiatric: Mood calm and affect normal.     Assessment/Plan     Problem List Items Addressed This Visit       Benign essential hypertension    Current Assessment & Plan   Blood pressure is currently well-controlled on carvedilol          CAD (coronary artery disease)    Current Assessment & Plan   Hx VF cardiac arrest/ STEMI in Feb 2022- has 6 stents- follows with Dr. Fadi Felder- on aspirin, lipitor, carvedilol               CKD stage 3 secondary to diabetes (Multi)    Current Assessment & Plan   Following with nephrology             Diabetes mellitus, type 2 (Multi)    Current Assessment & Plan   Continue metformin ER 1000 mg daily  Continue januvia and jardiance at current dosing   Check A1c and will make further dose adjustments   Refer endocrinology             Relevant Medications    blood sugar diagnostic (Blood Glucose Test)    Blood glucose monitoring meter    Other Relevant Orders    CBC and Auto  Differential    Comprehensive Metabolic Panel    Hemoglobin A1C    Lipid Panel    Referral to Endocrinology    History of ST elevation myocardial infarction (STEMI)    Hyperlipidemia    Ischemic cardiomyopathy    Relevant Medications    furosemide (Lasix) 40 mg tablet    Microalbuminuria    Paroxysmal atrial fibrillation (Multi)    Current Assessment & Plan   Currently in A fib-   Continue eliquis for stroke prevention   Continue carvedilol and amiodarone   Has repeat cardioversion pending next month   Follow up with Dr. Aponte Emerita          Positive colorectal cancer screening using Cologuard test - Primary    Current Assessment & Plan   Discussed still does need colonoscopy once A fib issues are controlled           Other Visit Diagnoses         Routine general medical examination at health care facility                Follow up in 6 months.      Rula Ferrell DO  5/29/2025         [1]   Current Outpatient Medications   Medication Sig Dispense Refill    amiodarone (Pacerone) 200 mg tablet Take 1 tablet (200 mg) by mouth early in the morning..      apixaban (Eliquis) 5 mg tablet Take 1 tablet (5 mg) by mouth 2 times a day. 60 tablet 0    aspirin-calcium carbonate 81 mg-300 mg calcium(777 mg) tablet Aspirin 81 MG TABS  Refills: 0      atorvastatin (Lipitor) 80 mg tablet Take 1 tablet (80 mg) by mouth once daily. 30 tablet 0    blood-glucose meter misc Test once daily 1 each 0    carvedilol (Coreg) 25 mg tablet Take 1 tablet (25 mg) by mouth 2 times a day. 60 tablet 0    cholecalciferol (Vitamin D-3) 25 MCG (1000 UT) capsule Take 1 capsule (25 mcg) by mouth once daily.      empagliflozin (Jardiance) 25 mg tablet Take 1 tablet (25 mg) by mouth once daily. 30 tablet 0    ferrous sulfate 325 (65 Fe) MG tablet Take by mouth.      metFORMIN XR (Glucophage-XR) 500 mg 24 hr tablet Take 2 tablets (1,000 mg) by mouth once daily in the evening. Take with meals. Do not crush, chew, or split. 60 tablet 0    sAXagliptin  (Onglyza) 2.5 mg tablet Take 1 tablet (2.5 mg) by mouth once daily. 30 tablet 0    Blood glucose monitoring meter Use to test blood sugars once daily 1 each 0    blood sugar diagnostic (Blood Glucose Test) Use to test blood sugars once daily 100 each 3    furosemide (Lasix) 40 mg tablet Take 1 tablet (40 mg) by mouth once daily. 90 tablet 1     No current facility-administered medications for this visit.

## 2025-05-29 ENCOUNTER — APPOINTMENT (OUTPATIENT)
Dept: PRIMARY CARE | Facility: CLINIC | Age: 70
End: 2025-05-29
Payer: MEDICARE

## 2025-05-29 VITALS
WEIGHT: 152 LBS | TEMPERATURE: 96.7 F | HEART RATE: 83 BPM | SYSTOLIC BLOOD PRESSURE: 105 MMHG | DIASTOLIC BLOOD PRESSURE: 70 MMHG | OXYGEN SATURATION: 97 % | BODY MASS INDEX: 23.11 KG/M2 | RESPIRATION RATE: 12 BRPM

## 2025-05-29 DIAGNOSIS — E11.22 CKD STAGE 3 SECONDARY TO DIABETES (MULTI): ICD-10-CM

## 2025-05-29 DIAGNOSIS — E78.5 HYPERLIPIDEMIA, UNSPECIFIED HYPERLIPIDEMIA TYPE: ICD-10-CM

## 2025-05-29 DIAGNOSIS — N18.32 TYPE 2 DIABETES MELLITUS WITH STAGE 3B CHRONIC KIDNEY DISEASE, WITHOUT LONG-TERM CURRENT USE OF INSULIN (MULTI): ICD-10-CM

## 2025-05-29 DIAGNOSIS — I25.2 HISTORY OF ST ELEVATION MYOCARDIAL INFARCTION (STEMI): ICD-10-CM

## 2025-05-29 DIAGNOSIS — I25.10 CORONARY ARTERY DISEASE INVOLVING NATIVE CORONARY ARTERY OF NATIVE HEART WITHOUT ANGINA PECTORIS: ICD-10-CM

## 2025-05-29 DIAGNOSIS — N18.30 CKD STAGE 3 SECONDARY TO DIABETES (MULTI): ICD-10-CM

## 2025-05-29 DIAGNOSIS — I10 BENIGN ESSENTIAL HYPERTENSION: ICD-10-CM

## 2025-05-29 DIAGNOSIS — E11.22 TYPE 2 DIABETES MELLITUS WITH STAGE 3B CHRONIC KIDNEY DISEASE, WITHOUT LONG-TERM CURRENT USE OF INSULIN (MULTI): ICD-10-CM

## 2025-05-29 DIAGNOSIS — R19.5 POSITIVE COLORECTAL CANCER SCREENING USING COLOGUARD TEST: Primary | ICD-10-CM

## 2025-05-29 DIAGNOSIS — I48.0 PAROXYSMAL ATRIAL FIBRILLATION (MULTI): ICD-10-CM

## 2025-05-29 DIAGNOSIS — Z00.00 ROUTINE GENERAL MEDICAL EXAMINATION AT HEALTH CARE FACILITY: ICD-10-CM

## 2025-05-29 DIAGNOSIS — I25.5 ISCHEMIC CARDIOMYOPATHY: ICD-10-CM

## 2025-05-29 DIAGNOSIS — R80.9 MICROALBUMINURIA: ICD-10-CM

## 2025-05-29 PROCEDURE — 99214 OFFICE O/P EST MOD 30 MIN: CPT | Performed by: FAMILY MEDICINE

## 2025-05-29 PROCEDURE — 1159F MED LIST DOCD IN RCRD: CPT | Performed by: FAMILY MEDICINE

## 2025-05-29 PROCEDURE — 3074F SYST BP LT 130 MM HG: CPT | Performed by: FAMILY MEDICINE

## 2025-05-29 PROCEDURE — 3078F DIAST BP <80 MM HG: CPT | Performed by: FAMILY MEDICINE

## 2025-05-29 PROCEDURE — 1123F ACP DISCUSS/DSCN MKR DOCD: CPT | Performed by: FAMILY MEDICINE

## 2025-05-29 PROCEDURE — 1160F RVW MEDS BY RX/DR IN RCRD: CPT | Performed by: FAMILY MEDICINE

## 2025-05-29 PROCEDURE — G2211 COMPLEX E/M VISIT ADD ON: HCPCS | Performed by: FAMILY MEDICINE

## 2025-05-29 PROCEDURE — 1036F TOBACCO NON-USER: CPT | Performed by: FAMILY MEDICINE

## 2025-05-29 RX ORDER — IBUPROFEN 200 MG
CAPSULE ORAL
Qty: 100 EACH | Refills: 3 | Status: SHIPPED | OUTPATIENT
Start: 2025-05-29

## 2025-05-29 RX ORDER — INSULIN PUMP SYRINGE, 3 ML
EACH MISCELLANEOUS
Qty: 1 EACH | Refills: 0 | Status: SHIPPED | OUTPATIENT
Start: 2025-05-29 | End: 2026-05-29

## 2025-05-29 RX ORDER — FUROSEMIDE 40 MG/1
40 TABLET ORAL DAILY
Qty: 90 TABLET | Refills: 1 | Status: SHIPPED | OUTPATIENT
Start: 2025-05-29

## 2025-05-29 RX ORDER — AMIODARONE HYDROCHLORIDE 200 MG/1
1 TABLET ORAL
COMMUNITY
Start: 2025-05-13

## 2025-05-29 ASSESSMENT — ENCOUNTER SYMPTOMS
COUGH: 0
FEVER: 0
DIARRHEA: 0
ABDOMINAL PAIN: 0
PSYCHIATRIC NEGATIVE: 1
VOMITING: 0
SHORTNESS OF BREATH: 0
CHILLS: 0
WEAKNESS: 0
FATIGUE: 0
BLOOD IN STOOL: 0
SORE THROAT: 0
HEADACHES: 0
PALPITATIONS: 0
NUMBNESS: 0
MUSCULOSKELETAL NEGATIVE: 1
NAUSEA: 0
DIZZINESS: 0
CONSTIPATION: 0

## 2025-05-29 NOTE — ASSESSMENT & PLAN NOTE
Continue metformin ER 1000 mg daily  Continue januvia and jardiance at current dosing   Check A1c and will make further dose adjustments   Refer endocrinology

## 2025-05-29 NOTE — ASSESSMENT & PLAN NOTE
Currently in A fib-   Continue eliquis for stroke prevention   Continue carvedilol and amiodarone   Has repeat cardioversion pending next month   Follow up with Dr. Fadi Felder

## 2025-06-02 ENCOUNTER — APPOINTMENT (OUTPATIENT)
Dept: NEPHROLOGY | Facility: CLINIC | Age: 70
End: 2025-06-02
Payer: MEDICARE

## 2025-06-03 ENCOUNTER — APPOINTMENT (OUTPATIENT)
Dept: NEPHROLOGY | Facility: CLINIC | Age: 70
End: 2025-06-03
Payer: MEDICARE

## 2025-06-04 DIAGNOSIS — N18.32 TYPE 2 DIABETES MELLITUS WITH STAGE 3B CHRONIC KIDNEY DISEASE, WITHOUT LONG-TERM CURRENT USE OF INSULIN (MULTI): Primary | ICD-10-CM

## 2025-06-04 DIAGNOSIS — I10 BENIGN ESSENTIAL HYPERTENSION: ICD-10-CM

## 2025-06-04 DIAGNOSIS — E11.22 TYPE 2 DIABETES MELLITUS WITH STAGE 3B CHRONIC KIDNEY DISEASE, WITHOUT LONG-TERM CURRENT USE OF INSULIN (MULTI): Primary | ICD-10-CM

## 2025-06-04 DIAGNOSIS — E11.9 CONTROLLED TYPE 2 DIABETES MELLITUS WITHOUT COMPLICATION, WITHOUT LONG-TERM CURRENT USE OF INSULIN: ICD-10-CM

## 2025-06-04 RX ORDER — CARVEDILOL 25 MG/1
25 TABLET ORAL 2 TIMES DAILY
Qty: 180 TABLET | Refills: 1 | OUTPATIENT
Start: 2025-06-04

## 2025-06-04 NOTE — TELEPHONE ENCOUNTER
Called pt regarding the refill for Carvedilol. Pt states that med was recently [place] for her when [place] and she does not need this refill, pt requesting refill canceled. MW 6-4-47

## 2025-06-08 DIAGNOSIS — I10 BENIGN ESSENTIAL HYPERTENSION: ICD-10-CM

## 2025-06-09 ENCOUNTER — APPOINTMENT (OUTPATIENT)
Dept: NEPHROLOGY | Facility: CLINIC | Age: 70
End: 2025-06-09
Payer: MEDICARE

## 2025-06-09 DIAGNOSIS — I25.10 CORONARY ARTERY DISEASE INVOLVING NATIVE CORONARY ARTERY OF NATIVE HEART WITHOUT ANGINA PECTORIS: ICD-10-CM

## 2025-06-09 RX ORDER — APIXABAN 5 MG/1
5 TABLET, FILM COATED ORAL 2 TIMES DAILY
Qty: 180 TABLET | Refills: 1 | Status: SHIPPED | OUTPATIENT
Start: 2025-06-09

## 2025-06-09 RX ORDER — CARVEDILOL 25 MG/1
25 TABLET ORAL 2 TIMES DAILY
Qty: 180 TABLET | Refills: 1 | Status: SHIPPED | OUTPATIENT
Start: 2025-06-09

## 2025-06-10 ENCOUNTER — HOSPITAL ENCOUNTER (OUTPATIENT)
Dept: HOSPITAL 100 - LAB | Age: 70
Discharge: HOME | End: 2025-06-10
Payer: MEDICARE

## 2025-06-10 DIAGNOSIS — N18.32: ICD-10-CM

## 2025-06-10 DIAGNOSIS — E11.22: Primary | ICD-10-CM

## 2025-06-10 LAB
ABSOLUTE NEUTROPHIL COUNT: 3.6 X10^3/UL (ref 2–7.7)
ALBUMIN SERPL-MCNC: 4 G/DL (ref 3.4–4.8)
ALBUMIN/GLOB SERPL: 1 RATIO (ref 0.9–2.4)
ALP SERPL-CCNC: 79 U/L (ref 35–104)
ALT SERPL-CCNC: 24 U/L (ref ?–34)
ANION GAP SERPL CALC-SCNC: 11 MMOL/L (ref 5–15)
ANISOCYTOSIS BLD QL SMEAR: (no result)
AST(SGOT): 33 U/L (ref ?–31)
BASO STIPL BLD QL SMEAR: (no result)
BASOPHIL#: 0.04 X10^3/UL
BASOPHIL%: 0.8 % (ref 0–1)
BASOPHILS NFR SPEC MANUAL: (no result) % (ref 0–1)
BITE CELLS BLD QL SMEAR: (no result)
BUN SERPL-MCNC: 22 MG/DL (ref 4–19)
BUN/CREAT SERPL: 13.5 RATIO (ref 10–20)
BURR CELLS BLD QL SMEAR: (no result)
CALCIUM SERPL-MCNC: 9.4 MG/DL (ref 7.6–11)
CHLORIDE: 103 MMOL/L (ref 98–108)
CHOLEST SERPL-MCNC: 94 MG/DL (ref ?–200)
CHOLESTEROL:HDL RATIO SCREEN: 1.83
CO2 BLDCV-SCNC: 22 MMOL/L (ref 21–32)
CREAT SERPL-MCNC: 1.63 MG/DL (ref 0.7–1.2)
CREATININE, URINE (RANDOM): 36.7 MG/DL (ref 28–217)
DEPRECATED RDW RBC: 51.6 FL (ref 35.1–43.9)
DOHLE BOD BLD QL SMEAR: (no result)
EOSINOPHIL # BLD: 0.17 X10^3/UL
ERYTHROCYTE [DISTWIDTH] IN BLOOD: 15.9 % (ref 11.6–14.6)
GLOBULIN: 3.9 G/DL (ref 2.2–4.2)
GLUCOSE SERPL-MCNC: 265 MG/DL (ref 70–99)
HBA1C MFR BLD: 10 % (ref ?–5.6)
HCT VFR BLD AUTO: 38.6 % (ref 37–47)
HDLC SERPL-MCNC: 51 MG/DL
HGB BLD-MCNC: 12 G/DL (ref 12–15)
HOWELL-JOLLY BOD BLD QL SMEAR: (no result)
HYPOCHROMIA BLD QL: (no result)
IMM GRANULOCYTES NFR BLD AUTO: 0.2 % (ref 0–0.9)
IMMATURE GRANULOCYTES COUNT: 0.01 X10^3/UL (ref 0–0)
LOW DENSITY LIPOPROTEIN CALC.: 32 MG/DL
LYMPHOCYTES # SPEC AUTO: 0.86 X10^3/UL (ref 0.83–4.51)
LYMPHOCYTES # SPEC AUTO: 0.86 X10^3/UL (ref 0.83–4.51)
LYMPHOCYTES NFR SPEC AUTO: 16.6 % (ref 19–41)
Lab: 3.3 % (ref 0–5)
Lab: 670.3 MG/G CRE
MACROCYTES BLD QL: (no result)
MANUAL DIF COMMENT BLD-IMP: (no result)
MCH RBC QN AUTO: 28.2 PG (ref 27–32)
MCV RBC: 90.6 FL (ref 81–99)
MEAN CORP HGB CONC: 31.1 G/DL (ref 32–36)
MEAN PLATELET VOL.: 9.5 FL (ref 6.2–12)
MICROALBUMIN UR-MCNC: 24.6 MG/L
MONOCYTE#: 0.45 X10^3/UL
MONOCYTE%: 8.7 % (ref 0–10)
NEUTROPHIL #: 3.64 X10^3/UL (ref 2.7–7.7)
NEUTROPHILS NFR BLD AUTO: 70.4 % (ref 47–70)
NEUTS HYPERSEG # BLD: (no result) 10*3/UL
NRBC FLAGGED BY ANALYZER: 0 % (ref 0–5)
PLATELET # BLD: 149 K/MM3 (ref 150–450)
POLYCHROMASIA BLD QL SMEAR: (no result)
POTASSIUM SPEC-MCNC: 5.1 MMOL/L (ref 3.3–5.1)
PROTEIN, TOTAL: 7.9 G/DL (ref 5.9–8.4)
RBC # BLD AUTO: 4.26 M/MM3 (ref 4.2–5.4)
SODIUM LEVEL: 136 MMOL/L (ref 133–145)
TOTAL BILIRUBIN: 1.05 MG/DL (ref 0–1.3)
TRIGLYCERIDES: 53 MG/DL
VLDLC SERPL-MCNC: 11 MG/DL (ref 5–40)
WBC # BLD AUTO: 5.2 K/MM3 (ref 4.4–11)
WBC NRBC COR # BLD: (no result) K/MM3 (ref 4.4–11)

## 2025-06-10 PROCEDURE — 80061 LIPID PANEL: CPT

## 2025-06-10 PROCEDURE — 36415 COLL VENOUS BLD VENIPUNCTURE: CPT

## 2025-06-10 PROCEDURE — 82043 UR ALBUMIN QUANTITATIVE: CPT

## 2025-06-10 PROCEDURE — 85025 COMPLETE CBC W/AUTO DIFF WBC: CPT

## 2025-06-10 PROCEDURE — 83036 HEMOGLOBIN GLYCOSYLATED A1C: CPT

## 2025-06-10 PROCEDURE — 82570 ASSAY OF URINE CREATININE: CPT

## 2025-06-10 PROCEDURE — 80053 COMPREHEN METABOLIC PANEL: CPT

## 2025-06-11 NOTE — TELEPHONE ENCOUNTER
Patient : Sara Hayden Age: 24 year old Sex: female   MRN: [id number] Encounter Date: 6/28/2022    E01/01  History     Chief Complaint   Patient presents with   • Headache Recurrent or Known DX Migraines     HPI  6/28/2022  11:39 AM Sara Hayden is a 24 year old female who presents to the ED for evaluation of pulsating posterior HA radiating to the front that began 3 days ago. She also reports vomiting. Pt last vomited around 7:50 AM. Pt has never been treated for migraines or evaluated for HA in the past. Pt notes that she was at [facility] and they recommended that she come to the ED. She also reports cough and sore throat. She denies fever, diarrhea, dysuria, rash, swollen joints, insect bites, abd pain, numbness, tingling, weakness, or any other associated sx. The patient denies any recent sick contacts. Pt's LMP was May 17th. She notes that she missed her period this month. The pt verbalizes no further complaints or modifying factors at this time.    PCP: No Pcp    11:39 AM I reviewed the patient's medications, allergies, and past medical and surgical history in Epic. Per Dr. Rojas's note, yesterday morning the headache was persisting so the pt reports taking 2 Excedrin and Tylenol every 2 hours.  Reports a total of 14 Excedrin yesterday.      No Known Allergies    Current Discharge Medication List      Prior to Admission Medications    Details   fluticasone propionate (FLOVENT HFA) 110 MCG/ACT inhaler Inhale 1 puff into the lungs.      Valacyclovir HCl 1000 MG Tab Take two tabs PO at onset of outbreaks.      hydrOXYzine (ATARAX) 25 MG tablet Take 1 tablet by mouth 3 times daily as needed for Itching.  Qty: 20 tablet, Refills: 0             Past Medical History:   Diagnosis Date   • Anxiety    • RAD (reactive airway disease)    • Wears glasses     Contacts occ       Past Surgical History:   Procedure Laterality Date   • TONSILLECTOMY      3 yrs       Family History   Problem Relation Age of Onset   •  Please inform labs are showing diabetes is uncontrolled with an A1c of 10.0     Has she scheduled with endocrinology yet?  If not needs to do so ASAP     I will have our office clinical pharmacist Elvia assist in interim to improve blood sugar control     Also kidney function is stable but urine is showing quite a bit more protein -- likely from the diabetes  She should follow up with Dr. Lazcano on this    Diabetes Mother    • Asthma Brother        Social History     Tobacco Use   • Smoking status: Never Smoker   • Smokeless tobacco: Never Used   Vaping Use   • Vaping Use: never used   Substance Use Topics   • Alcohol use: Yes     Alcohol/week: 3.0 - 4.0 standard drinks     Types: 3 - 4 Shots of liquor per week     Comment: Tequila beverages   • Drug use: Yes     Types: Marijuana     Comment: Last used 7/11/2020       E-cigarette/Vaping   • E-Cigarette/Vaping Use Never Used      E-Cigarette/Vaping Substances & Devices       Review of Systems   Constitutional: Negative for chills, diaphoresis, fatigue and fever.   HENT: Negative.    Eyes: Negative.    Respiratory: Negative for cough, chest tightness, shortness of breath and wheezing.    Cardiovascular: Negative for chest pain, palpitations and leg swelling.   Gastrointestinal: Positive for nausea and vomiting. Negative for abdominal distention, abdominal pain, constipation and diarrhea.   Endocrine: Negative for polydipsia.   Genitourinary: Negative for difficulty urinating, dysuria, flank pain, hematuria, menstrual problem, pelvic pain, vaginal bleeding and vaginal discharge.   Musculoskeletal: Negative for back pain, joint swelling and myalgias.   Skin: Negative for color change, pallor and rash.   Allergic/Immunologic: Negative for immunocompromised state.   Neurological: Positive for headaches. Negative for dizziness, weakness, light-headedness and numbness.   Hematological: Does not bruise/bleed easily.   Psychiatric/Behavioral: Negative for confusion and suicidal ideas. The patient is not nervous/anxious.    All other systems reviewed and are negative.      Physical Exam     ED Triage Vitals [06/28/22 1111]   ED Triage Vitals Group      Temp 99 °F (37.2 °C)      Heart Rate 62      Resp 18      BP (!) 150/82      SpO2 99 %      EtCO2 mmHg       Height 5' 1\" (1.549 m)      Weight 210 lb 12.8 oz (95.6 kg)      Weight Scale Used Standing scale      BMI (Calculated)  39.83      IBW/kg (Calculated) 47.8       Physical Exam  Vitals and nursing note reviewed.   Constitutional:       General: She is not in acute distress.     Appearance: She is well-developed.   HENT:      Head: Normocephalic and atraumatic.      Right Ear: Tympanic membrane and external ear normal.      Left Ear: Tympanic membrane and external ear normal.      Nose: Nose normal.   Eyes:      Conjunctiva/sclera: Conjunctivae normal.      Pupils: Pupils are equal, round, and reactive to light.      Comments: Mild photophobia.   Neck:      Thyroid: No thyromegaly.      Vascular: No JVD.   Cardiovascular:      Rate and Rhythm: Normal rate and regular rhythm.      Heart sounds: Normal heart sounds, S1 normal and S2 normal. No murmur heard.    No friction rub. No gallop.   Pulmonary:      Effort: Pulmonary effort is normal. No respiratory distress.      Breath sounds: Normal breath sounds.   Abdominal:      General: Bowel sounds are normal. There is no distension.      Palpations: Abdomen is soft. There is no mass.      Tenderness: There is no abdominal tenderness. There is no guarding or rebound.   Musculoskeletal:         General: Normal range of motion.      Cervical back: Normal range of motion and neck supple.   Skin:     General: Skin is warm and dry.      Findings: No rash.   Neurological:      Mental Status: She is alert and oriented to person, place, and time.      GCS: GCS eye subscore is 4. GCS verbal subscore is 5. GCS motor subscore is 6.      Cranial Nerves: No cranial nerve deficit.      Sensory: No sensory deficit.      Motor: No abnormal muscle tone.      Deep Tendon Reflexes: Reflexes normal.   Psychiatric:         Speech: Speech normal.         Behavior: Behavior normal.         Thought Content: Thought content normal.         Judgment: Judgment normal.         ED Course     Procedures    Lab Results     Results for orders placed or performed during the hospital encounter of 06/28/22   CBC with  Automated Differential (performable only)   Result Value Ref Range    WBC 8.9 4.2 - 11.0 K/mcL    RBC 4.84 4.00 - 5.20 mil/mcL    HGB 13.3 12.0 - 15.5 g/dL    HCT 39.8 36.0 - 46.5 %    MCV 82.2 78.0 - 100.0 fl    MCH 27.5 26.0 - 34.0 pg    MCHC 33.4 32.0 - 36.5 g/dL    RDW-CV 12.7 11.0 - 15.0 %    RDW-SD 37.9 (L) 39.0 - 50.0 fL     140 - 450 K/mcL    NRBC 0 <=0 /100 WBC    Neutrophil, Percent 72 %    Lymphocytes, Percent 19 %    Mono, Percent 7 %    Eosinophils, Percent 1 %    Basophils, Percent 0 %    Immature Granulocytes 1 %    Absolute Neutrophils 6.4 1.8 - 7.7 K/mcL    Absolute Lymphocytes 1.7 1.0 - 4.8 K/mcL    Absolute Monocytes 0.6 0.3 - 0.9 K/mcL    Absolute Eosinophils  0.1 0.0 - 0.5 K/mcL    Absolute Basophils 0.0 0.0 - 0.3 K/mcL    Absolute Immmature Granulocytes 0.1 0.0 - 0.2 K/mcL   ISTAT8 VENOUS  POINT OF CARE   Result Value Ref Range    BUN - POINT OF CARE 5 (L) 6 - 20 mg/dL    SODIUM - POINT OF CARE 141 135 - 145 mmol/L    POTASSIUM - POINT OF CARE 3.7 3.4 - 5.1 mmol/L    CHLORIDE - POINT OF CARE 105 97 - 110 mmol/L    TCO2 - POINT OF CARE 26 (H) 19 - 24 mmol/L    ANION GAP - POINT OF CARE 15 7 - 19 mmol/L    HEMATOCRIT - POINT OF CARE 42.0 36.0 - 46.5 %    HEMOGLOBIN - POINT OF CARE 14.3 12.0 - 15.5 g/dL    GLUCOSE - POINT OF CARE 100 (H) 70 - 99 mg/dL    CALCIUM, IONIZED - POINT OF CARE 1.25 1.15 - 1.29 mmol/L    Creatinine 0.40 (L) 0.51 - 0.95 mg/dL    Glomerular Filtration Rate >90 >=60   ISTAT BETA HCG - POINT OF CARE   Result Value Ref Range    ISTAT BETA HCG - POINT OF CARE <5.0 <5.0 IU/L       EKG Results     Encounter Date: 06/28/22   Electrocardiogram 12-Lead   Result Value    Ventricular Rate EKG/Min (BPM) 61    Atrial Rate (BPM) 61    OR-Interval (MSEC) 160    QRS-Interval (MSEC) 88    QT-Interval (MSEC) 416    QTc 419    P Axis (Degrees) 9    R Axis (Degrees) 56    T Axis (Degrees) 56    REPORT TEXT      Normal sinus rhythm  with sinus arrhythmia  Normal ECG  No previous ECGs  available  normal QTC  Confirmed by JOHNNY PAIGE, ELENI ([id number]),  Zachery Craft ([id number]) on 6/28/2022 12:33:56 PM       Radiology Results     Imaging Results          CT HEAD WO CONTRAST (Final result)  Result time 06/28/22 12:31:04    Final result                 Impression:    IMPRESSION:    1. No evidence for intracranial hemorrhage, mass effect, acute large vessel  territory infarct or acute intracranial process.    2. Maxillary sinus disease.                 Narrative:    EXAM: CT HEAD WO CONTRAST    CLINICAL HISTORY: 24 years old Female presents with Headache, intracranial  hemorrhage suspected.    COMPARISON: None.    TECHNIQUE: Contiguous axial noncontrast images of the brain were obtained.    One or more of the following dose reduction techniques were used: automated  exposure control, adjustment of the mA and/or kV according to patient size,  use of iterative reconstruction.    FINDINGS:    There is no evidence for intracranial hemorrhage, mass effect, midline  shift or acute large vessel territory infarct.           There is no evidence of obstructive hydrocephalus.    Secretions in the maxillary sinuses with probable retention cyst LEFT  sphenoid sinus indicative of sinus disease.      The mastoid air cells are clear.    No evidence for calvarial fracture.                                ED Medication Orders (From admission, onward)    Ordered Start     Status Ordering Provider    06/28/22 1146 06/28/22 1147  sodium chloride (NORMAL SALINE) 0.9 % bolus 1,000 mL  ONCE         Last MAR action: New Bag ELENI PAIGE    06/28/22 1146 06/28/22 1147  fentaNYL (SUBLIMAZE) injection 50 mcg  ONCE         Last MAR action: Given ELENI PAIGE    06/28/22 1146 06/28/22 1146  droperidol (INAPSINE) injection 1.25 mg  ONCE         Last MAR action: Given ELENI PAIGE               [facility]    Vitals  Vitals:    06/28/22 1111 06/28/22 1231 06/28/22 1247   BP: (!) 150/82 112/59    BP Location: LUE - Left upper  extremity     Patient Position: Sitting     Pulse: 62 64 62   Resp: 18 (!) 21 19   Temp: 99 °F (37.2 °C)     TempSrc: Oral     SpO2: 99%  98%   Weight: 95.6 kg (210 lb 12.8 oz)     Height: 5' 1\" (1.549 m)     LMP: 05/17/2022       ED Course  11:39 AM Initial Impression: The pt is a 24 year old female who presents to the ED today with pulsating posterior HA radiating to the front that began 3 days ago. CT head, blood work, and EKG will be ordered to assess the pt's condition. Plan to administer Fentanyl and fluids for sx relief. Pt understands and agrees with the plan. All questions addressed.     1:17 PM I rechecked the patient who is laying in bed comfortably. She notes that her HA is much improved. We discussed the results of CT head which shows no acute intracranial abnormalities. We discussed there was findings significant for sinusitis which could cause her sx. We discussed her sx could be caused by a migraine as well. I informed them of the plan to continue care at home. I advise the pt to return to the ED for any new or worsening symptoms. We discussed following up with their PCP, especially if sx persist. The pt understands and agrees with the plan. All questions answered.     MDM  Critical Care    No Critical Care    Clinical Impression and Diagnosis  1:18 PM       ED Diagnoses     Diagnosis Comment Associated Orders       Final diagnoses    Acute nonintractable headache, unspecified headache type -- --    Vomiting in adult -- --          The patient was provided with a recommendation to follow up with a primary care provider and obtain reassessment of his/her blood pressure within three months.    Follow Up:  Luis F Meneses MD  [address]  [facility] [address]  [phone]    Schedule an appointment as soon as possible for a visit in 2 days  for re-check of symptoms          Summary of your Discharge Medications      Take these Medications      Details   ondansetron 4 MG disintegrating  tablet  Commonly known as: Zofran ODT   Place 1 tablet onto the tongue every 6 hours as needed for Nausea.            Pt is discharged to home/self care in stable condition.     I have reviewed the information recorded by the scribe for accuracy and agree with its contents.    ____________________________________________________________________    Zachery Craft acting as a scribe for Dr. Tushar Hart  Dictation # [id number]  Scribe: Zachery Hart MD  06/28/22 [id number]

## 2025-06-13 ENCOUNTER — APPOINTMENT (OUTPATIENT)
Dept: NEPHROLOGY | Facility: CLINIC | Age: 70
End: 2025-06-13
Payer: MEDICARE

## 2025-06-13 VITALS
SYSTOLIC BLOOD PRESSURE: 126 MMHG | DIASTOLIC BLOOD PRESSURE: 80 MMHG | WEIGHT: 158.4 LBS | HEART RATE: 99 BPM | BODY MASS INDEX: 24.01 KG/M2 | HEIGHT: 68 IN

## 2025-06-13 DIAGNOSIS — N18.30 CKD STAGE 3 SECONDARY TO DIABETES (MULTI): Primary | ICD-10-CM

## 2025-06-13 DIAGNOSIS — E11.22 TYPE 2 DIABETES MELLITUS WITH STAGE 3B CHRONIC KIDNEY DISEASE, WITHOUT LONG-TERM CURRENT USE OF INSULIN (MULTI): ICD-10-CM

## 2025-06-13 DIAGNOSIS — N18.32 TYPE 2 DIABETES MELLITUS WITH STAGE 3B CHRONIC KIDNEY DISEASE, WITHOUT LONG-TERM CURRENT USE OF INSULIN (MULTI): ICD-10-CM

## 2025-06-13 DIAGNOSIS — E11.22 CKD STAGE 3 SECONDARY TO DIABETES (MULTI): Primary | ICD-10-CM

## 2025-06-13 DIAGNOSIS — E78.5 HYPERLIPIDEMIA, UNSPECIFIED HYPERLIPIDEMIA TYPE: ICD-10-CM

## 2025-06-13 DIAGNOSIS — I10 BENIGN ESSENTIAL HYPERTENSION: ICD-10-CM

## 2025-06-13 DIAGNOSIS — E55.9 VITAMIN D DEFICIENCY: ICD-10-CM

## 2025-06-13 PROCEDURE — 1160F RVW MEDS BY RX/DR IN RCRD: CPT | Performed by: CLINICAL NURSE SPECIALIST

## 2025-06-13 PROCEDURE — 3074F SYST BP LT 130 MM HG: CPT | Performed by: CLINICAL NURSE SPECIALIST

## 2025-06-13 PROCEDURE — 3079F DIAST BP 80-89 MM HG: CPT | Performed by: CLINICAL NURSE SPECIALIST

## 2025-06-13 PROCEDURE — 1159F MED LIST DOCD IN RCRD: CPT | Performed by: CLINICAL NURSE SPECIALIST

## 2025-06-13 PROCEDURE — 3008F BODY MASS INDEX DOCD: CPT | Performed by: CLINICAL NURSE SPECIALIST

## 2025-06-13 PROCEDURE — 1036F TOBACCO NON-USER: CPT | Performed by: CLINICAL NURSE SPECIALIST

## 2025-06-13 PROCEDURE — 99213 OFFICE O/P EST LOW 20 MIN: CPT | Performed by: CLINICAL NURSE SPECIALIST

## 2025-06-13 ASSESSMENT — ENCOUNTER SYMPTOMS
EYES NEGATIVE: 1
HEMATOLOGIC/LYMPHATIC NEGATIVE: 1
SHORTNESS OF BREATH: 1
GASTROINTESTINAL NEGATIVE: 1
CARDIOVASCULAR NEGATIVE: 1
PSYCHIATRIC NEGATIVE: 1
NEUROLOGICAL NEGATIVE: 1
ENDOCRINE NEGATIVE: 1
ALLERGIC/IMMUNOLOGIC NEGATIVE: 1
MUSCULOSKELETAL NEGATIVE: 1
CONSTITUTIONAL NEGATIVE: 1

## 2025-06-13 NOTE — ASSESSMENT & PLAN NOTE
Hemoglobin A1c was 10, blood sugars have been higher, she is going to start working on getting better control of this and also increasing some of her activity, she would like to try to do this herself before medications are changed

## 2025-06-13 NOTE — ASSESSMENT & PLAN NOTE
Slight increase in creatinine up to 1.63, blood pressure is well-controlled, diabetes not well-controlled, she is going to work on this, on SGLT2, I will see her in 6 months secondary to a slight uptrend in her creatinine

## 2025-06-13 NOTE — PROGRESS NOTES
Subjective   Patient ID: Carrie Newton is a 70 y.o. female who presents for Follow-up (1 year/Review labs ).  Patient being seen in follow-up for chronic kidney disease stage III with history of diabetes and hypertension    Labs reviewed    Labs reviewed  Glucose 265  Renal function the BUN of 22 and creatinine of 1.63, GFR is 34  Sodium 136, potassium 5.1, chloride 103, bicarb 22  Albumin creatinine ratio 36.7  Total cholesterol 94, triglycerides 53, HDL 51  Hemoglobin A1c 10    She states her blood sugars have been out of control, she was down to see her grandchildren and was cooking and baking more than usual  She has also been drinking Catherine each night with some bananas and strawberries  She is going to work over the next several months and try to get better control of her blood sugars  She is also complaining of some problems with her atrial fibs and feels winded more easily when she is exercising or walking        Review of Systems   Constitutional: Negative.    HENT: Negative.     Eyes: Negative.    Respiratory:  Positive for shortness of breath (With exertion).    Cardiovascular: Negative.    Gastrointestinal: Negative.    Endocrine: Negative.    Genitourinary: Negative.    Musculoskeletal: Negative.    Skin: Negative.    Allergic/Immunologic: Negative.    Neurological: Negative.    Hematological: Negative.    Psychiatric/Behavioral: Negative.         Objective   Physical Exam  Constitutional:       Appearance: Normal appearance.   HENT:      Head: Normocephalic and atraumatic.      Mouth/Throat:      Mouth: Mucous membranes are moist.   Eyes:      Extraocular Movements: Extraocular movements intact.   Cardiovascular:      Rate and Rhythm: Normal rate. Rhythm irregular.      Heart sounds: Normal heart sounds.   Pulmonary:      Effort: Pulmonary effort is normal.      Breath sounds: Normal breath sounds.   Abdominal:      General: Bowel sounds are normal.      Palpations: Abdomen is soft.    Musculoskeletal:         General: Normal range of motion.   Skin:     General: Skin is warm and dry.   Neurological:      Mental Status: She is alert.   Psychiatric:         Behavior: Behavior normal.         Thought Content: Thought content normal.         Assessment/Plan   Problem List Items Addressed This Visit           ICD-10-CM    Benign essential hypertension I10    Blood pressure is well controlled with carvedilol 25 mg twice a day         CKD stage 3 secondary to diabetes (Multi) - Primary E11.22, N18.30    Slight increase in creatinine up to 1.63, blood pressure is well-controlled, diabetes not well-controlled, she is going to work on this, on SGLT2, I will see her in 6 months secondary to a slight uptrend in her creatinine         Relevant Orders    Comprehensive metabolic panel    Follow Up In Nephrology    CBC    Diabetes mellitus, type 2 (Multi) E11.9    Hemoglobin A1c was 10, blood sugars have been higher, she is going to start working on getting better control of this and also increasing some of her activity, she would like to try to do this herself before medications are changed         Hyperlipidemia E78.5    Vitamin D deficiency E55.9     Chronic kidney disease stage III with Baseline 1.3-1.5  Diabetes mellitus type 2  Hypertension on carvedilol and Cardizem  Positive MUNA screen: Centromere antibody  Hyperlipidemia   Coronary artery disease  Atrial fibrillation       TODD Murry-BILL, DNP 06/13/25 11:04 AM

## 2025-06-27 ENCOUNTER — TELEPHONE (OUTPATIENT)
Dept: PRIMARY CARE | Facility: CLINIC | Age: 70
End: 2025-06-27
Payer: MEDICARE

## 2025-06-27 NOTE — TELEPHONE ENCOUNTER
Lmtcb - received refill for Atorvastatin from [facility], but pt needs future appt scheduled for Medicare Wellness physical (last MWV was 11-26-25) before medication can be ordered. MW 6-27-25

## 2025-07-01 DIAGNOSIS — E78.5 HYPERLIPIDEMIA, UNSPECIFIED HYPERLIPIDEMIA TYPE: ICD-10-CM

## 2025-07-01 RX ORDER — ATORVASTATIN CALCIUM 80 MG/1
80 TABLET, FILM COATED ORAL DAILY
Qty: 90 TABLET | Refills: 3 | Status: SHIPPED | OUTPATIENT
Start: 2025-07-01 | End: 2026-07-01

## 2025-07-10 LAB
ANION GAP SERPL CALC-SCNC: 12 MMOL/L (ref 5–15)
BUN SERPL-MCNC: 23 MG/DL (ref 4–19)
BUN/CREAT SERPL: 15.2 RATIO (ref 10–20)
CALCIUM SERPL-MCNC: 9.4 MG/DL (ref 7.6–11)
CHLORIDE: 104 MMOL/L (ref 98–108)
CO2 BLDCV-SCNC: 22.2 MMOL/L (ref 21–32)
CREAT SERPL-MCNC: 1.54 MG/DL (ref 0.7–1.2)
GLUCOSE SERPL-MCNC: 211 MG/DL (ref 70–99)
POTASSIUM SPEC-MCNC: 4.6 MMOL/L (ref 3.3–5.1)
SODIUM LEVEL: 138 MMOL/L (ref 133–145)

## 2025-07-11 ENCOUNTER — HOSPITAL ENCOUNTER (OUTPATIENT)
Age: 70
Discharge: HOME | End: 2025-07-11
Payer: MEDICARE

## 2025-07-11 DIAGNOSIS — I48.19: Primary | ICD-10-CM

## 2025-07-11 PROCEDURE — 93306 TTE W/DOPPLER COMPLETE: CPT

## 2025-07-25 LAB
ANION GAP SERPL CALC-SCNC: 10 MMOL/L (ref 5–15)
BUN SERPL-MCNC: 21 MG/DL (ref 4–19)
BUN/CREAT SERPL: 13.8 RATIO (ref 10–20)
CALCIUM SERPL-MCNC: 9.1 MG/DL (ref 7.6–11)
CHLORIDE: 105 MMOL/L (ref 98–108)
CO2 BLDCV-SCNC: 22.8 MMOL/L (ref 21–32)
CREAT SERPL-MCNC: 1.52 MG/DL (ref 0.7–1.2)
GLUCOSE SERPL-MCNC: 217 MG/DL (ref 70–99)
INTERNATIONAL NORMALIZED RATIO: 1.6
POTASSIUM SPEC-MCNC: 4.8 MMOL/L (ref 3.3–5.1)
PROTHROMBIN TIME (PROTIME)PT.: 19.5 SECONDS (ref 11.7–14.9)
SODIUM LEVEL: 137 MMOL/L (ref 133–145)

## 2025-07-30 ENCOUNTER — HOSPITAL ENCOUNTER (OUTPATIENT)
Age: 70
Discharge: HOME | End: 2025-07-30
Payer: MEDICARE

## 2025-07-30 DIAGNOSIS — I25.5: ICD-10-CM

## 2025-07-30 DIAGNOSIS — I25.10: ICD-10-CM

## 2025-07-30 DIAGNOSIS — I10: ICD-10-CM

## 2025-07-30 DIAGNOSIS — Z95.5: ICD-10-CM

## 2025-07-30 DIAGNOSIS — I48.19: Primary | ICD-10-CM

## 2025-07-30 DIAGNOSIS — E78.2: ICD-10-CM

## 2025-07-30 DIAGNOSIS — I25.2: ICD-10-CM

## 2025-07-30 DIAGNOSIS — Z86.74: ICD-10-CM

## 2025-07-30 DIAGNOSIS — Z79.899: ICD-10-CM

## 2025-07-30 DIAGNOSIS — Z79.82: ICD-10-CM

## 2025-07-30 DIAGNOSIS — Z79.01: ICD-10-CM

## 2025-07-30 PROCEDURE — A4216 STERILE WATER/SALINE, 10 ML: HCPCS

## 2025-07-30 PROCEDURE — 93320 DOPPLER ECHO COMPLETE: CPT

## 2025-07-30 PROCEDURE — 93005 ELECTROCARDIOGRAM TRACING: CPT

## 2025-07-30 PROCEDURE — 93312 ECHO TRANSESOPHAGEAL: CPT

## 2025-07-30 PROCEDURE — 80048 BASIC METABOLIC PNL TOTAL CA: CPT

## 2025-07-30 PROCEDURE — 92960 CARDIOVERSION ELECTRIC EXT: CPT

## 2025-07-30 PROCEDURE — 93325 DOPPLER ECHO COLOR FLOW MAPG: CPT

## 2025-07-30 PROCEDURE — 36415 COLL VENOUS BLD VENIPUNCTURE: CPT

## 2025-07-30 PROCEDURE — 85610 PROTHROMBIN TIME: CPT

## 2025-07-31 DIAGNOSIS — E11.22 TYPE 2 DIABETES MELLITUS WITH STAGE 3B CHRONIC KIDNEY DISEASE, WITHOUT LONG-TERM CURRENT USE OF INSULIN (MULTI): ICD-10-CM

## 2025-07-31 DIAGNOSIS — N18.32 TYPE 2 DIABETES MELLITUS WITH STAGE 3B CHRONIC KIDNEY DISEASE, WITHOUT LONG-TERM CURRENT USE OF INSULIN (MULTI): ICD-10-CM

## 2025-08-08 RX ORDER — METFORMIN HYDROCHLORIDE 500 MG/1
1000 TABLET, EXTENDED RELEASE ORAL
Qty: 60 TABLET | Refills: 3 | Status: SHIPPED | OUTPATIENT
Start: 2025-08-08 | End: 2026-08-08

## 2025-11-26 ENCOUNTER — APPOINTMENT (OUTPATIENT)
Dept: PRIMARY CARE | Facility: CLINIC | Age: 70
End: 2025-11-26
Payer: MEDICARE

## 2025-12-11 ENCOUNTER — APPOINTMENT (OUTPATIENT)
Dept: NEPHROLOGY | Facility: CLINIC | Age: 70
End: 2025-12-11
Payer: MEDICARE